# Patient Record
Sex: MALE | Race: BLACK OR AFRICAN AMERICAN | NOT HISPANIC OR LATINO | URBAN - METROPOLITAN AREA
[De-identification: names, ages, dates, MRNs, and addresses within clinical notes are randomized per-mention and may not be internally consistent; named-entity substitution may affect disease eponyms.]

---

## 2018-02-25 ENCOUNTER — INPATIENT (INPATIENT)
Facility: HOSPITAL | Age: 78
LOS: 4 days | Discharge: HOME | End: 2018-03-02
Attending: INTERNAL MEDICINE | Admitting: INTERNAL MEDICINE
Payer: SELF-PAY

## 2018-02-25 VITALS
RESPIRATION RATE: 16 BRPM | HEART RATE: 63 BPM | DIASTOLIC BLOOD PRESSURE: 36 MMHG | OXYGEN SATURATION: 100 % | TEMPERATURE: 98 F | SYSTOLIC BLOOD PRESSURE: 74 MMHG

## 2018-02-25 DIAGNOSIS — I48.91 UNSPECIFIED ATRIAL FIBRILLATION: Chronic | ICD-10-CM

## 2018-02-25 DIAGNOSIS — I10 ESSENTIAL (PRIMARY) HYPERTENSION: Chronic | ICD-10-CM

## 2018-02-25 LAB
APTT BLD: 48.5 SEC — HIGH (ref 27.5–37.4)
BASE EXCESS BLDV CALC-SCNC: -0.3 MMOL/L — SIGNIFICANT CHANGE UP
BASOPHILS # BLD AUTO: 0.07 K/UL — SIGNIFICANT CHANGE UP (ref 0–0.2)
BASOPHILS NFR BLD AUTO: 0.3 % — SIGNIFICANT CHANGE UP (ref 0–2)
BLOOD GAS VENOUS - CREATININE: 3.8 MG/DL — HIGH (ref 0.5–1.3)
CHLORIDE BLDV-SCNC: 105 MMOL/L — SIGNIFICANT CHANGE UP (ref 96–108)
EOSINOPHIL # BLD AUTO: 0.01 K/UL — SIGNIFICANT CHANGE UP (ref 0–0.5)
EOSINOPHIL NFR BLD AUTO: 0 % — SIGNIFICANT CHANGE UP (ref 0–6)
GAS PNL BLDV: 136 MMOL/L — SIGNIFICANT CHANGE UP (ref 136–146)
GLUCOSE BLDV-MCNC: 132 — HIGH (ref 70–99)
HCO3 BLDV-SCNC: 23 MMOL/L — SIGNIFICANT CHANGE UP (ref 20–27)
HCT VFR BLD CALC: 38.7 % — LOW (ref 39–50)
HCT VFR BLDV CALC: 39.3 % — SIGNIFICANT CHANGE UP (ref 39–51)
HGB BLD-MCNC: 13.1 G/DL — SIGNIFICANT CHANGE UP (ref 13–17)
HGB BLDV-MCNC: 12.8 G/DL — LOW (ref 13–17)
IMM GRANULOCYTES # BLD AUTO: 0.43 # — SIGNIFICANT CHANGE UP
IMM GRANULOCYTES NFR BLD AUTO: 2.1 % — HIGH (ref 0–1.5)
INR BLD: 1.98 — HIGH (ref 0.88–1.17)
LACTATE BLDV-MCNC: 3.8 MMOL/L — HIGH (ref 0.5–2)
LYMPHOCYTES # BLD AUTO: 1.13 K/UL — SIGNIFICANT CHANGE UP (ref 1–3.3)
LYMPHOCYTES # BLD AUTO: 5.5 % — LOW (ref 13–44)
MCHC RBC-ENTMCNC: 32.1 PG — SIGNIFICANT CHANGE UP (ref 27–34)
MCHC RBC-ENTMCNC: 33.9 % — SIGNIFICANT CHANGE UP (ref 32–36)
MCV RBC AUTO: 94.9 FL — SIGNIFICANT CHANGE UP (ref 80–100)
MONOCYTES # BLD AUTO: 1.55 K/UL — HIGH (ref 0–0.9)
MONOCYTES NFR BLD AUTO: 7.6 % — SIGNIFICANT CHANGE UP (ref 2–14)
NEUTROPHILS # BLD AUTO: 17.22 K/UL — HIGH (ref 1.8–7.4)
NEUTROPHILS NFR BLD AUTO: 84.5 % — HIGH (ref 43–77)
NRBC # FLD: 0 — SIGNIFICANT CHANGE UP
OB PNL STL: NEGATIVE — SIGNIFICANT CHANGE UP
PCO2 BLDV: 49 MMHG — SIGNIFICANT CHANGE UP (ref 41–51)
PH BLDV: 7.33 PH — SIGNIFICANT CHANGE UP (ref 7.32–7.43)
PLATELET # BLD AUTO: 163 K/UL — SIGNIFICANT CHANGE UP (ref 150–400)
PMV BLD: 10.9 FL — SIGNIFICANT CHANGE UP (ref 7–13)
PO2 BLDV: 42 MMHG — HIGH (ref 35–40)
POTASSIUM BLDV-SCNC: 5.1 MMOL/L — HIGH (ref 3.4–4.5)
PROTHROM AB SERPL-ACNC: 23.1 SEC — HIGH (ref 9.8–13.1)
RBC # BLD: 4.08 M/UL — LOW (ref 4.2–5.8)
RBC # FLD: 12.7 % — SIGNIFICANT CHANGE UP (ref 10.3–14.5)
REVIEW TO FOLLOW: YES — SIGNIFICANT CHANGE UP
SAO2 % BLDV: 68.4 % — SIGNIFICANT CHANGE UP (ref 60–85)
WBC # BLD: 20.41 K/UL — HIGH (ref 3.8–10.5)
WBC # FLD AUTO: 20.41 K/UL — HIGH (ref 3.8–10.5)

## 2018-02-25 RX ORDER — SODIUM CHLORIDE 9 MG/ML
1000 INJECTION INTRAMUSCULAR; INTRAVENOUS; SUBCUTANEOUS ONCE
Qty: 0 | Refills: 0 | Status: COMPLETED | OUTPATIENT
Start: 2018-02-25 | End: 2018-02-25

## 2018-02-25 RX ADMIN — SODIUM CHLORIDE 1000 MILLILITER(S): 9 INJECTION INTRAMUSCULAR; INTRAVENOUS; SUBCUTANEOUS at 23:00

## 2018-02-25 NOTE — ED PROVIDER NOTE - OBJECTIVE STATEMENT
78yo male h/o htn, afib, p/w syncope. Pt was sitting on toilet and had syncope. unsure if fell and hit his head, denies symptoms prior to syncope. pt was resting for rest of the day and then told aide later about syncope and came to ED> NO chest pain, no sob. Pt says just feeling generally unwell. brought to ED, and hypotensive here,  meds: simvastatin, xarelto, metoprolol, lisinopril, cardizem, tamsulosin

## 2018-02-25 NOTE — ED PROVIDER NOTE - PROGRESS NOTE DETAILS
ED Attending: Jeffrey  Pt signed out to me from Dr. Mancia to f/u and reassess.  Pt feeling better, still with some pain.  Found to have diverticulitis and rcv'd zosyn prior to ct. Clearing lactate.  BP improving but still low.  No urinary output today, per pt.  Also with elevated Cr, unknown baseline. US renal pending, to be followed by admitting team.  Accepted by Dr. Waters. ED Attending: Jeffrey  Pt signed out to me from Dr. Mancia to f/u and reassess.  Pt feeling better, still with some pain.  Found to have diverticulitis and rcv'd zosyn prior to ct. Clearing lactate.  BP improving but still low.  No urinary output today, per pt.  Also with elevated Cr, unknown baseline. US renal pending, to be followed by admitting team.  On bedside sono approx 100 ml urine in bladder, after 3 L IV fluids. No urge to urinate at present.  Apparently has a h/o BPH but does not appear to be in retention. Accepted by Dr. Waters.

## 2018-02-25 NOTE — ED PROVIDER NOTE - ATTENDING CONTRIBUTION TO CARE
[place]  pt with  HTN afib  on xarelto   syncopal episode this am on toilet  placed back in bed  Came here due to persistent dizziness  Pt denies CP back pain HA  fever chills  no bleeding  has baseline dark stool on iron  c/o vague pain lt groin  exam  pt alert fluent speech  moving all extremities   lungs clear  card mild bradycardia  abd nontender  nl distal pulses  rectal  green brown stool     bedside FAST negative, bedside  cardic  no effusion  small collapsable IVC  grossly nl LV function  RV<LV  nl RV free wall motion  EKG  no ischemic changes  negative rt sided leads  not hypothermic  Plan  IV fluids  serial CBC  CT noncon to assess for RP bleed     pt w/o baseline lab tests here

## 2018-02-25 NOTE — ED ADULT NURSE NOTE - OBJECTIVE STATEMENT
Pt received to [facility]. Presents alert and oriented from home after an episode of syncope this am while in the shower that required assistance to stand. Pt rpts feeling dizzy prior to passing out. Denies any blood in stools. Rpts chronic black stools due to iron pills. Respirations appear even and unlabored. Rpts being on Xarelto for a heart condition. Noted to be Sinus kathleen on cardiac monitor. VS noted. MD aware of hypotension. R hand 20 gauge IV placed and L AC 18 gauge IV placed. Labs sent. Rpt given to primary RN. Will continue to monitor.

## 2018-02-25 NOTE — ED ADULT TRIAGE NOTE - CHIEF COMPLAINT QUOTE
pt comes to ED for low pressure BP and fall pt is on blood thinners. pt family states BP at home was 60/40. pt BP in triage is 76/36. pt complains of dizziness upon standing. pt also fell at home does not know if he hit his head. pt is on Xarelto. pt complains of dizziness, blurry vision and dry mouth. Charge Nurse called pt brought to [facility] pt comes to ED for low pressure BP and fall pt is on blood thinners. pt family states BP at home was 60/40. pt BP in triage is 76/36. pt complains of dizziness upon standing. pt also fell at home does not know if he hit his head. pt is on Xarelto. pt complains of dizziness, blurry vision and dry mouth. Charge Nurse called pt brought to [facility] unable to get EKG in triage

## 2018-02-25 NOTE — ED PROVIDER NOTE - MEDICAL DECISION MAKING DETAILS
76yo male with syncopal episode today and now hyporentsive, no infectious symptoms, will check labs, ekg, ce, admit

## 2018-02-25 NOTE — ED ADULT NURSE NOTE - CHIEF COMPLAINT QUOTE
pt comes to ED for low pressure BP and fall pt is on blood thinners. pt family states BP at home was 60/40. pt BP in triage is 76/36. pt complains of dizziness upon standing. pt also fell at home does not know if he hit his head. pt is on Xarelto. pt complains of dizziness, blurry vision and dry mouth. Charge Nurse called pt brought to [facility] C unable to get EKG in triage

## 2018-02-25 NOTE — ED PROVIDER NOTE - CARE PLAN
Principal Discharge DX:	Diverticulitis  Secondary Diagnosis:	Renal insufficiency  Secondary Diagnosis:	Sepsis

## 2018-02-26 DIAGNOSIS — R55 SYNCOPE AND COLLAPSE: ICD-10-CM

## 2018-02-26 DIAGNOSIS — I10 ESSENTIAL (PRIMARY) HYPERTENSION: ICD-10-CM

## 2018-02-26 DIAGNOSIS — K80.20 CALCULUS OF GALLBLADDER WITHOUT CHOLECYSTITIS WITHOUT OBSTRUCTION: ICD-10-CM

## 2018-02-26 DIAGNOSIS — Z98.890 OTHER SPECIFIED POSTPROCEDURAL STATES: Chronic | ICD-10-CM

## 2018-02-26 DIAGNOSIS — I48.91 UNSPECIFIED ATRIAL FIBRILLATION: ICD-10-CM

## 2018-02-26 DIAGNOSIS — K57.92 DIVERTICULITIS OF INTESTINE, PART UNSPECIFIED, WITHOUT PERFORATION OR ABSCESS WITHOUT BLEEDING: ICD-10-CM

## 2018-02-26 DIAGNOSIS — N17.9 ACUTE KIDNEY FAILURE, UNSPECIFIED: ICD-10-CM

## 2018-02-26 DIAGNOSIS — Z29.9 ENCOUNTER FOR PROPHYLACTIC MEASURES, UNSPECIFIED: ICD-10-CM

## 2018-02-26 LAB
ALBUMIN SERPL ELPH-MCNC: 3.3 G/DL — SIGNIFICANT CHANGE UP (ref 3.3–5)
ALBUMIN SERPL ELPH-MCNC: 3.6 G/DL — SIGNIFICANT CHANGE UP (ref 3.3–5)
ALP SERPL-CCNC: 53 U/L — SIGNIFICANT CHANGE UP (ref 40–120)
ALP SERPL-CCNC: 53 U/L — SIGNIFICANT CHANGE UP (ref 40–120)
ALT FLD-CCNC: 16 U/L — SIGNIFICANT CHANGE UP (ref 4–41)
ALT FLD-CCNC: 16 U/L — SIGNIFICANT CHANGE UP (ref 4–41)
ANISOCYTOSIS BLD QL: SLIGHT — SIGNIFICANT CHANGE UP
AST SERPL-CCNC: 25 U/L — SIGNIFICANT CHANGE UP (ref 4–40)
AST SERPL-CCNC: 27 U/L — SIGNIFICANT CHANGE UP (ref 4–40)
BASE EXCESS BLDV CALC-SCNC: -0.6 MMOL/L — SIGNIFICANT CHANGE UP
BASOPHILS # BLD AUTO: 0.07 K/UL — SIGNIFICANT CHANGE UP (ref 0–0.2)
BASOPHILS NFR BLD AUTO: 0.4 % — SIGNIFICANT CHANGE UP (ref 0–2)
BASOPHILS NFR SPEC: 0.9 % — SIGNIFICANT CHANGE UP (ref 0–2)
BILIRUB SERPL-MCNC: 0.5 MG/DL — SIGNIFICANT CHANGE UP (ref 0.2–1.2)
BILIRUB SERPL-MCNC: 0.5 MG/DL — SIGNIFICANT CHANGE UP (ref 0.2–1.2)
BLOOD GAS VENOUS - CREATININE: 3.33 MG/DL — HIGH (ref 0.5–1.3)
BUN SERPL-MCNC: 38 MG/DL — HIGH (ref 7–23)
BUN SERPL-MCNC: 42 MG/DL — HIGH (ref 7–23)
CALCIUM SERPL-MCNC: 8.6 MG/DL — SIGNIFICANT CHANGE UP (ref 8.4–10.5)
CALCIUM SERPL-MCNC: 9.9 MG/DL — SIGNIFICANT CHANGE UP (ref 8.4–10.5)
CHLORIDE BLDV-SCNC: 111 MMOL/L — HIGH (ref 96–108)
CHLORIDE SERPL-SCNC: 100 MMOL/L — SIGNIFICANT CHANGE UP (ref 98–107)
CHLORIDE SERPL-SCNC: 104 MMOL/L — SIGNIFICANT CHANGE UP (ref 98–107)
CHOLEST SERPL-MCNC: 121 MG/DL — SIGNIFICANT CHANGE UP (ref 120–199)
CK MB BLD-MCNC: 0.6 — SIGNIFICANT CHANGE UP (ref 0–2.5)
CK MB BLD-MCNC: 1.14 NG/ML — SIGNIFICANT CHANGE UP (ref 1–6.6)
CK MB BLD-MCNC: 3.14 NG/ML — SIGNIFICANT CHANGE UP (ref 1–6.6)
CK MB BLD-MCNC: 3.87 NG/ML — SIGNIFICANT CHANGE UP (ref 1–6.6)
CK SERPL-CCNC: 201 U/L — HIGH (ref 30–200)
CK SERPL-CCNC: 440 U/L — HIGH (ref 30–200)
CK SERPL-CCNC: 617 U/L — HIGH (ref 30–200)
CO2 SERPL-SCNC: 22 MMOL/L — SIGNIFICANT CHANGE UP (ref 22–31)
CO2 SERPL-SCNC: 23 MMOL/L — SIGNIFICANT CHANGE UP (ref 22–31)
CREAT SERPL-MCNC: 2.93 MG/DL — HIGH (ref 0.5–1.3)
CREAT SERPL-MCNC: 3.75 MG/DL — HIGH (ref 0.5–1.3)
EOSINOPHIL # BLD AUTO: 0.15 K/UL — SIGNIFICANT CHANGE UP (ref 0–0.5)
EOSINOPHIL NFR BLD AUTO: 0.9 % — SIGNIFICANT CHANGE UP (ref 0–6)
EOSINOPHIL NFR FLD: 0 % — SIGNIFICANT CHANGE UP (ref 0–6)
GAS PNL BLDV: 132 MMOL/L — LOW (ref 136–146)
GIANT PLATELETS BLD QL SMEAR: PRESENT — SIGNIFICANT CHANGE UP
GLUCOSE BLDV-MCNC: 117 — HIGH (ref 70–99)
GLUCOSE SERPL-MCNC: 125 MG/DL — HIGH (ref 70–99)
GLUCOSE SERPL-MCNC: 99 MG/DL — SIGNIFICANT CHANGE UP (ref 70–99)
HBA1C BLD-MCNC: 6 % — HIGH (ref 4–5.6)
HCO3 BLDV-SCNC: 24 MMOL/L — SIGNIFICANT CHANGE UP (ref 20–27)
HCT VFR BLD CALC: 34 % — LOW (ref 39–50)
HCT VFR BLDV CALC: 34.2 % — LOW (ref 39–51)
HDLC SERPL-MCNC: 65 MG/DL — HIGH (ref 35–55)
HGB BLD-MCNC: 11.5 G/DL — LOW (ref 13–17)
HGB BLDV-MCNC: 11.1 G/DL — LOW (ref 13–17)
IMM GRANULOCYTES # BLD AUTO: 0.13 # — SIGNIFICANT CHANGE UP
IMM GRANULOCYTES NFR BLD AUTO: 0.8 % — SIGNIFICANT CHANGE UP (ref 0–1.5)
LACTATE BLDV-MCNC: 0.9 MMOL/L — SIGNIFICANT CHANGE UP (ref 0.5–2)
LACTATE SERPL-SCNC: 2.2 MMOL/L — HIGH (ref 0.5–2)
LIPID PNL WITH DIRECT LDL SERPL: 48 MG/DL — SIGNIFICANT CHANGE UP
LYMPHOCYTES # BLD AUTO: 1.78 K/UL — SIGNIFICANT CHANGE UP (ref 1–3.3)
LYMPHOCYTES # BLD AUTO: 10.5 % — LOW (ref 13–44)
LYMPHOCYTES NFR SPEC AUTO: 6.4 % — LOW (ref 13–44)
MACROCYTES BLD QL: SLIGHT — SIGNIFICANT CHANGE UP
MAGNESIUM SERPL-MCNC: 1.9 MG/DL — SIGNIFICANT CHANGE UP (ref 1.6–2.6)
MCHC RBC-ENTMCNC: 32.2 PG — SIGNIFICANT CHANGE UP (ref 27–34)
MCHC RBC-ENTMCNC: 33.8 % — SIGNIFICANT CHANGE UP (ref 32–36)
MCV RBC AUTO: 95.2 FL — SIGNIFICANT CHANGE UP (ref 80–100)
METAMYELOCYTES # FLD: 1.8 % — HIGH (ref 0–1)
METHOD TYPE: SIGNIFICANT CHANGE UP
MONOCYTES # BLD AUTO: 1.58 K/UL — HIGH (ref 0–0.9)
MONOCYTES NFR BLD AUTO: 9.3 % — SIGNIFICANT CHANGE UP (ref 2–14)
MONOCYTES NFR BLD: 7.4 % — SIGNIFICANT CHANGE UP (ref 2–9)
NEUTROPHIL AB SER-ACNC: 75.2 % — SIGNIFICANT CHANGE UP (ref 43–77)
NEUTROPHILS # BLD AUTO: 13.27 K/UL — HIGH (ref 1.8–7.4)
NEUTROPHILS NFR BLD AUTO: 78.1 % — HIGH (ref 43–77)
NEUTS BAND # BLD: 8.3 % — HIGH (ref 0–6)
NRBC # FLD: 0 — SIGNIFICANT CHANGE UP
ORGANISM # SPEC MICROSCOPIC CNT: SIGNIFICANT CHANGE UP
ORGANISM # SPEC MICROSCOPIC CNT: SIGNIFICANT CHANGE UP
OVALOCYTES BLD QL SMEAR: SLIGHT — SIGNIFICANT CHANGE UP
PCO2 BLDV: 43 MMHG — SIGNIFICANT CHANGE UP (ref 41–51)
PH BLDV: 7.37 PH — SIGNIFICANT CHANGE UP (ref 7.32–7.43)
PHOSPHATE SERPL-MCNC: 1.8 MG/DL — LOW (ref 2.5–4.5)
PLATELET # BLD AUTO: 145 K/UL — LOW (ref 150–400)
PLATELET COUNT - ESTIMATE: NORMAL — SIGNIFICANT CHANGE UP
PMV BLD: 10.3 FL — SIGNIFICANT CHANGE UP (ref 7–13)
PO2 BLDV: 64 MMHG — HIGH (ref 35–40)
POIKILOCYTOSIS BLD QL AUTO: SLIGHT — SIGNIFICANT CHANGE UP
POTASSIUM BLDV-SCNC: 4.1 MMOL/L — SIGNIFICANT CHANGE UP (ref 3.4–4.5)
POTASSIUM SERPL-MCNC: 4.6 MMOL/L — SIGNIFICANT CHANGE UP (ref 3.5–5.3)
POTASSIUM SERPL-MCNC: 5 MMOL/L — SIGNIFICANT CHANGE UP (ref 3.5–5.3)
POTASSIUM SERPL-SCNC: 4.6 MMOL/L — SIGNIFICANT CHANGE UP (ref 3.5–5.3)
POTASSIUM SERPL-SCNC: 5 MMOL/L — SIGNIFICANT CHANGE UP (ref 3.5–5.3)
PROT SERPL-MCNC: 6.4 G/DL — SIGNIFICANT CHANGE UP (ref 6–8.3)
PROT SERPL-MCNC: 7.1 G/DL — SIGNIFICANT CHANGE UP (ref 6–8.3)
RBC # BLD: 3.57 M/UL — LOW (ref 4.2–5.8)
RBC # FLD: 12.7 % — SIGNIFICANT CHANGE UP (ref 10.3–14.5)
SAO2 % BLDV: 90.7 % — HIGH (ref 60–85)
SODIUM SERPL-SCNC: 138 MMOL/L — SIGNIFICANT CHANGE UP (ref 135–145)
SODIUM SERPL-SCNC: 140 MMOL/L — SIGNIFICANT CHANGE UP (ref 135–145)
SPECIMEN SOURCE: SIGNIFICANT CHANGE UP
TRIGL SERPL-MCNC: 65 MG/DL — SIGNIFICANT CHANGE UP (ref 10–149)
TROPONIN T SERPL-MCNC: < 0.06 NG/ML — SIGNIFICANT CHANGE UP (ref 0–0.06)
TSH SERPL-MCNC: 1.48 UIU/ML — SIGNIFICANT CHANGE UP (ref 0.27–4.2)
WBC # BLD: 16.98 K/UL — HIGH (ref 3.8–10.5)
WBC # FLD AUTO: 16.98 K/UL — HIGH (ref 3.8–10.5)

## 2018-02-26 PROCEDURE — 76705 ECHO EXAM OF ABDOMEN: CPT | Mod: 26

## 2018-02-26 PROCEDURE — 74176 CT ABD & PELVIS W/O CONTRAST: CPT | Mod: 26

## 2018-02-26 PROCEDURE — 70450 CT HEAD/BRAIN W/O DYE: CPT | Mod: 26

## 2018-02-26 PROCEDURE — 99223 1ST HOSP IP/OBS HIGH 75: CPT | Mod: AI,GC

## 2018-02-26 PROCEDURE — 71045 X-RAY EXAM CHEST 1 VIEW: CPT | Mod: 26

## 2018-02-26 PROCEDURE — 76775 US EXAM ABDO BACK WALL LIM: CPT | Mod: 26

## 2018-02-26 RX ORDER — DILTIAZEM HCL 120 MG
1 CAPSULE, EXT RELEASE 24 HR ORAL
Qty: 0 | Refills: 0 | COMMUNITY

## 2018-02-26 RX ORDER — RIVAROXABAN 15 MG-20MG
20 KIT ORAL EVERY 24 HOURS
Qty: 0 | Refills: 0 | Status: DISCONTINUED | OUTPATIENT
Start: 2018-02-26 | End: 2018-02-26

## 2018-02-26 RX ORDER — PIPERACILLIN AND TAZOBACTAM 4; .5 G/20ML; G/20ML
3.38 INJECTION, POWDER, LYOPHILIZED, FOR SOLUTION INTRAVENOUS EVERY 8 HOURS
Qty: 0 | Refills: 0 | Status: DISCONTINUED | OUTPATIENT
Start: 2018-02-26 | End: 2018-03-02

## 2018-02-26 RX ORDER — LISINOPRIL 2.5 MG/1
1 TABLET ORAL
Qty: 0 | Refills: 0 | COMMUNITY

## 2018-02-26 RX ORDER — SODIUM CHLORIDE 9 MG/ML
1000 INJECTION INTRAMUSCULAR; INTRAVENOUS; SUBCUTANEOUS
Qty: 0 | Refills: 0 | Status: DISCONTINUED | OUTPATIENT
Start: 2018-02-26 | End: 2018-02-26

## 2018-02-26 RX ORDER — ACETAMINOPHEN 500 MG
650 TABLET ORAL EVERY 6 HOURS
Qty: 0 | Refills: 0 | Status: DISCONTINUED | OUTPATIENT
Start: 2018-02-26 | End: 2018-03-02

## 2018-02-26 RX ORDER — GABAPENTIN 400 MG/1
300 CAPSULE ORAL AT BEDTIME
Qty: 0 | Refills: 0 | Status: DISCONTINUED | OUTPATIENT
Start: 2018-02-26 | End: 2018-03-02

## 2018-02-26 RX ORDER — DOCUSATE SODIUM 100 MG
1 CAPSULE ORAL
Qty: 0 | Refills: 0 | COMMUNITY

## 2018-02-26 RX ORDER — SIMVASTATIN 20 MG/1
1 TABLET, FILM COATED ORAL
Qty: 0 | Refills: 0 | COMMUNITY

## 2018-02-26 RX ORDER — METRONIDAZOLE 500 MG
500 TABLET ORAL EVERY 8 HOURS
Qty: 0 | Refills: 0 | Status: DISCONTINUED | OUTPATIENT
Start: 2018-02-26 | End: 2018-02-26

## 2018-02-26 RX ORDER — HEPARIN SODIUM 5000 [USP'U]/ML
5000 INJECTION INTRAVENOUS; SUBCUTANEOUS EVERY 8 HOURS
Qty: 0 | Refills: 0 | Status: DISCONTINUED | OUTPATIENT
Start: 2018-02-26 | End: 2018-03-02

## 2018-02-26 RX ORDER — IRON POLYSACCHARIDE COMPLEX 150 MG
1 CAPSULE ORAL
Qty: 0 | Refills: 0 | COMMUNITY

## 2018-02-26 RX ORDER — PIPERACILLIN AND TAZOBACTAM 4; .5 G/20ML; G/20ML
3.38 INJECTION, POWDER, LYOPHILIZED, FOR SOLUTION INTRAVENOUS ONCE
Qty: 0 | Refills: 0 | Status: COMPLETED | OUTPATIENT
Start: 2018-02-26 | End: 2018-02-26

## 2018-02-26 RX ORDER — SODIUM CHLORIDE 9 MG/ML
1000 INJECTION INTRAMUSCULAR; INTRAVENOUS; SUBCUTANEOUS ONCE
Qty: 0 | Refills: 0 | Status: COMPLETED | OUTPATIENT
Start: 2018-02-26 | End: 2018-02-26

## 2018-02-26 RX ORDER — SODIUM CHLORIDE 9 MG/ML
1000 INJECTION, SOLUTION INTRAVENOUS ONCE
Qty: 0 | Refills: 0 | Status: COMPLETED | OUTPATIENT
Start: 2018-02-26 | End: 2018-02-26

## 2018-02-26 RX ORDER — SIMVASTATIN 20 MG/1
10 TABLET, FILM COATED ORAL AT BEDTIME
Qty: 0 | Refills: 0 | Status: DISCONTINUED | OUTPATIENT
Start: 2018-02-26 | End: 2018-03-02

## 2018-02-26 RX ORDER — POTASSIUM PHOSPHATE, MONOBASIC POTASSIUM PHOSPHATE, DIBASIC 236; 224 MG/ML; MG/ML
15 INJECTION, SOLUTION INTRAVENOUS ONCE
Qty: 0 | Refills: 0 | Status: COMPLETED | OUTPATIENT
Start: 2018-02-26 | End: 2018-02-26

## 2018-02-26 RX ORDER — SODIUM CHLORIDE 9 MG/ML
1000 INJECTION INTRAMUSCULAR; INTRAVENOUS; SUBCUTANEOUS
Qty: 0 | Refills: 0 | Status: DISCONTINUED | OUTPATIENT
Start: 2018-02-26 | End: 2018-03-02

## 2018-02-26 RX ORDER — SENNA PLUS 8.6 MG/1
1 TABLET ORAL
Qty: 0 | Refills: 0 | COMMUNITY

## 2018-02-26 RX ORDER — TAMSULOSIN HYDROCHLORIDE 0.4 MG/1
1 CAPSULE ORAL
Qty: 0 | Refills: 0 | COMMUNITY

## 2018-02-26 RX ORDER — CIPROFLOXACIN LACTATE 400MG/40ML
400 VIAL (ML) INTRAVENOUS EVERY 24 HOURS
Qty: 0 | Refills: 0 | Status: DISCONTINUED | OUTPATIENT
Start: 2018-02-26 | End: 2018-02-26

## 2018-02-26 RX ORDER — GABAPENTIN 400 MG/1
1 CAPSULE ORAL
Qty: 0 | Refills: 0 | COMMUNITY

## 2018-02-26 RX ORDER — TAMSULOSIN HYDROCHLORIDE 0.4 MG/1
0.4 CAPSULE ORAL AT BEDTIME
Qty: 0 | Refills: 0 | Status: DISCONTINUED | OUTPATIENT
Start: 2018-02-26 | End: 2018-03-02

## 2018-02-26 RX ORDER — METOPROLOL TARTRATE 50 MG
1 TABLET ORAL
Qty: 0 | Refills: 0 | COMMUNITY

## 2018-02-26 RX ADMIN — PIPERACILLIN AND TAZOBACTAM 200 GRAM(S): 4; .5 INJECTION, POWDER, LYOPHILIZED, FOR SOLUTION INTRAVENOUS at 01:22

## 2018-02-26 RX ADMIN — Medication 650 MILLIGRAM(S): at 20:33

## 2018-02-26 RX ADMIN — PIPERACILLIN AND TAZOBACTAM 25 GRAM(S): 4; .5 INJECTION, POWDER, LYOPHILIZED, FOR SOLUTION INTRAVENOUS at 23:04

## 2018-02-26 RX ADMIN — GABAPENTIN 300 MILLIGRAM(S): 400 CAPSULE ORAL at 23:04

## 2018-02-26 RX ADMIN — Medication 200 MILLIGRAM(S): at 17:16

## 2018-02-26 RX ADMIN — HEPARIN SODIUM 5000 UNIT(S): 5000 INJECTION INTRAVENOUS; SUBCUTANEOUS at 23:04

## 2018-02-26 RX ADMIN — SODIUM CHLORIDE 75 MILLILITER(S): 9 INJECTION INTRAMUSCULAR; INTRAVENOUS; SUBCUTANEOUS at 14:02

## 2018-02-26 RX ADMIN — SODIUM CHLORIDE 1000 MILLILITER(S): 9 INJECTION, SOLUTION INTRAVENOUS at 03:50

## 2018-02-26 RX ADMIN — SIMVASTATIN 10 MILLIGRAM(S): 20 TABLET, FILM COATED ORAL at 23:04

## 2018-02-26 RX ADMIN — TAMSULOSIN HYDROCHLORIDE 0.4 MILLIGRAM(S): 0.4 CAPSULE ORAL at 23:04

## 2018-02-26 RX ADMIN — POTASSIUM PHOSPHATE, MONOBASIC POTASSIUM PHOSPHATE, DIBASIC 62.5 MILLIMOLE(S): 236; 224 INJECTION, SOLUTION INTRAVENOUS at 14:00

## 2018-02-26 RX ADMIN — SODIUM CHLORIDE 100 MILLILITER(S): 9 INJECTION INTRAMUSCULAR; INTRAVENOUS; SUBCUTANEOUS at 16:09

## 2018-02-26 RX ADMIN — SODIUM CHLORIDE 1000 MILLILITER(S): 9 INJECTION INTRAMUSCULAR; INTRAVENOUS; SUBCUTANEOUS at 01:03

## 2018-02-26 NOTE — MEDICAL STUDENT ADULT H&P (EDUCATION) - NS MD HP STUD HX OF PRESENT ILLNESS FT
Patient is a 77 year-old male w/ PMH of HTN and Atrial fibrillation s/p ablation on Xarelto presenting after syncopal event. 1 day prior to admission, patient was sitting on toilet, bending over to dry his toes after his morning bath and states that he "passed out" and then awoke on the ground. Patient was staying with his daughter and she heard him fall. Patient likely "passed out" for a few minutes. Immediately after the event, patient reported blurry vision, dry mouth, imbalance when trying to walk, and blood pressure of 60/30. Symptoms gradually resolved over the course of the day but blood pressure reading was grossly unchanged ~8 hrs after event. Daughter brought patient in due to concern given anticoagulation regimen.    Patient also complaining of 3-4 days of left lower abdominal pain preceding the syncopal event described as sharp, 5-6/10, non-radiating, constant, with no worsening or mitigating factors. He endorses bloating but no nausea, vomiting, diarrhea, or blood in stool.    Patient denies prior syncopal events, lightheadedness, or palpitations. Patient endorses normal diet and intake in the days prior to event but did not eat/drink after the event.  At time of interview, patient denies blurred vision, focal weakness, numbness, leg swelling, dyspnea.    In the ED: patient received 3L of isotonic fluid in 1L boluses as well as a dose of Zosyn 3.375grams

## 2018-02-26 NOTE — MEDICAL STUDENT ADULT H&P (EDUCATION) - NS MD HP STUD PE GI FT
Abdomen distended and tympanitic to percussion. Normal bowel sounds. Tenderness to palpitation in LLQ. Non-tender in other quadarants, including RUQ.

## 2018-02-26 NOTE — H&P ADULT - PROBLEM SELECTOR PLAN 3
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

## 2018-02-26 NOTE — H&P ADULT - PROBLEM SELECTOR PLAN 4
-[id number] score of 3  -on xarelto at home  -will continue to hold given Cr elevation  -will restart once Cr downtrends

## 2018-02-26 NOTE — H&P ADULT - HISTORY OF PRESENT ILLNESS
Patient is a 77 year-old male w/ PMH of HTN and Atrial fibrillation s/p ablation on Xarelto presenting after syncopal event. 1 day prior to admission, patient was sitting on toilet, bending over to dry his toes after his morning bath and states that he "passed out" and then awoke on the ground. Patient was staying with his daughter and she heard him fall. Patient likely "passed out" for a few minutes. Immediately after the event, patient reported blurry vision, dry mouth, imbalance when trying to walk, and blood pressure of 60/30. Symptoms gradually resolved over the course of the day but blood pressure reading was grossly unchanged ~8 hrs after event. Daughter brought patient in due to concern given anticoagulation regimen.    Patient also complaining of 3-4 days of left lower abdominal pain preceding the syncopal event described as sharp, 5-6/10, non-radiating, constant, with no worsening or mitigating factors. He endorses bloating but no nausea, vomiting, diarrhea, or blood in stool.     Patient denies prior syncopal events, lightheadedness, or palpitations. Patient endorses normal diet and intake in the days prior to event but did not eat/drink after the event, stating that he had reduced PO intake yesterday.   At time of interview, patient denies blurred vision, focal weakness, numbness, leg swelling, dyspnea. Patient denied N, V, D, chills, fevers. States that he had a prior colonoscopy 1-2 yrs ago, where he underwent a polypectomy but was not told about diverticular disease.      In the ED: patient received 3L of isotonic fluid in 1L boluses as well as a dose of Zosyn 3.375grams Patient is a 77 year-old male w/ PMH of HTN and Atrial fibrillation s/p ablation on Xarelto presenting after syncopal event.  Of note patient's baseline Cr is 1.50 and  CKD stage 2-3a 2/2 to HTN per patient's PCP office. Patient also has hx of recently diagnosed MGUS. Patient states that 1 day prior to admission, he was sitting on toilet, bending over to dry his toes after his morning bath and states that he "passed out" and then awoke on the ground. Patient was staying with his daughter and she heard him fall. Patient likely "passed out" for a few minutes. Immediately after the event, patient reported blurry vision, dry mouth, imbalance when trying to walk, and blood pressure of 60/30. Symptoms gradually resolved over the course of the day but blood pressure reading was grossly unchanged ~8 hrs after event. Daughter brought patient in due to concern given anticoagulation regimen.    Patient also complaining of 3-4 days of left lower abdominal pain preceding the syncopal event described as sharp, 5-6/10, non-radiating, constant, with no worsening or mitigating factors. He endorses bloating but no nausea, vomiting, diarrhea, or blood in stool.     Patient denies prior syncopal events, lightheadedness, or palpitations. Patient endorses normal diet and intake in the days prior to event but did not eat/drink after the event, stating that he had reduced PO intake yesterday.   At time of interview, patient denies blurred vision, focal weakness, numbness, leg swelling, dyspnea. Patient denied N, V, D, chills, fevers. States that he had a prior colonoscopy 1-2 yrs ago, where he underwent a polypectomy but was not told about diverticular disease.      In the ED: patient received 3L of isotonic fluid in 1L boluses as well as a dose of Zosyn 3.375grams

## 2018-02-26 NOTE — H&P ADULT - PROBLEM SELECTOR PLAN 5
- per U/S Partially contracted gallbladder containing 1.5 cm nonmobile gallstone at   the gallbladder neck.  No evidence of acute cholecystitis.  -Continue to monitor, pt asymtomatic

## 2018-02-26 NOTE — H&P ADULT - PROBLEM SELECTOR PLAN 1
- in the setting of poor PO intake  -likely vasovagal in the setting of dehydration  - s/p 3L IVF boluses  -Continue IVF

## 2018-02-26 NOTE — MEDICAL STUDENT ADULT H&P (EDUCATION) - NS MD HP STUD RESULTS LAB FT
CBC:  WBC - 20.41 -> 16.98  H/H - 13.1/38.7 -> 11.5/34  Plt - 163 -> 145    Coagulation Studies:  INR - 1.98  PT - 23.1  aPTT - 48.5    FOBT - negative

## 2018-02-26 NOTE — MEDICAL STUDENT ADULT H&P (EDUCATION) - NS MD HP STUD ASPLAN ASSES FT
77 year-old male presenting for syncopal episode in the setting of uncomplicated acute diverticulitis with labs significant for leukocytosis, lactatemia without acidosis, and hypophosphatemia. 77 year-old male presenting for syncopal episode, in the setting of uncomplicated acute diverticulitis with labs significant for leukocytosis, lactatemia without acidosis, hypophosphatemia, elevated CK, and labs indicating CRYS. 77 year-old male w/ PMH of HTN and AFib on Xarelto s/p ablation presenting for syncopal episode, in the setting of uncomplicated acute diverticulitis with labs significant for leukocytosis, lactatemia without acidosis, hypophosphatemia, elevated CK, elevated INR, and labs indicating CRYS.

## 2018-02-26 NOTE — MEDICAL STUDENT ADULT H&P (EDUCATION) - NS MD HP STUD PE CONSITUTIONAL FT
Patient well appearing, in no apparent distress, sitting up in bed and openly cooperating with history and physical exam.

## 2018-02-26 NOTE — MEDICAL STUDENT ADULT H&P (EDUCATION) - NS MD HP STUD SOCIAL HX FT
Prior alcohol use - 1 pint/day. Quit in 1984  Prior tobacco use - pack/day for 60 years. Quit 3 years ago.  No other drug use.    Patient lives in [place] with his brother. He occasionally stays with his daughter, who lives near the hospital. She assists in managing his care as she is an RN.  Patient can walk without assistance at baseline functional status.

## 2018-02-26 NOTE — MEDICAL STUDENT ADULT H&P (EDUCATION) - NS MD HP STUD MED REC FT
Med Rec per patient's daughter  Simvastatin 10mg HS  Xarelto 20mg  Metoprolol tartrate 50mg BID  Lisinopril 2.5mg daily  Gabapentin 300mg HS  Diltiazem 120mg daily  Tamulosin 0.4mg daily  Vitamin B  Vitamin D  CoQ10 200mg daily  Ferrex 150 BID  Colace 50  Sennosides 8.6mg

## 2018-02-26 NOTE — MEDICAL STUDENT ADULT H&P (EDUCATION) - NS MD HP STUD RESULTS RAD FT
CXR: Unremarkable  CT Head Non-Contrast: no acute intracranial bleed, mass effect, midline shift  CT Abdomen Non-Contrast: acute uncomplicated diverticulitis, cholelithiasis, tiny calcific granulomas in liver and spleen, no appendix  Abdominal Ultrasound: 1.5 non-mobile gallstone in gallbladder neck, no signs of acute cholecystitis, negative sonographic Neville's sign

## 2018-02-26 NOTE — MEDICAL STUDENT ADULT H&P (EDUCATION) - NS MD HP STUD PE NEURO FT
Cranial Nerves II-XII intact; pupils 3mm and reactive to light, EOMI, sensation equal bilaterally to light touch, facial muscles symmetric, tongue protrusion symmetric, trapezius strength 5/5 bilaterally.    Strength: 5/5 in all extremities  Sensation: intact to light touch in all extremities

## 2018-02-26 NOTE — H&P ADULT - NSHPSOCIALHISTORY_GEN_ALL_CORE
Prior alcohol use - 1 pint/day. Quit in 1984  Prior tobacco use - pack/day for 60 years. Quit 3 years ago.  No other drug use.    Patient lives in [place] with his brother. He occasionally stays with his daughter, who lives near the hospital. She assists in managing his care as she is an RN.  Patient can walk without assistance at baseline functional status.

## 2018-02-26 NOTE — MEDICAL STUDENT ADULT H&P (EDUCATION) - NS MD HP STUD GENERAL FT
Patient is a 77 year-old male w/ PMH of HTN and Atrial fibrillation s/p ablation on Xarelto presenting after syncopal event. 1 day prior to admission, patient was sitting on toilet, bending over to dry his toes after his morning bath and states that he "passed out" and then awoke on the ground. Patient was staying with his daughter and she heard him fall. Patient likely "passed out" for a few minutes. Immediately after the event, patient reported blurry vision, dry mouth, imbalance when trying to walk, and blood pressure of 60/30. These symptoms gradually resolved over the course of the day but daughter brought patient in due to concern given anticoagulation regimen.    Patient also complaining of 3-4 days of left lower abdominal pain preceding the syncopal event described as sharp, 5-6/10, non-radiating, constant, with no worsening or mitigating factors. He endorses bloating but no nausea, vomiting, diarrhea, or blood in stool.    Patient denies prior syncopal events, lightheadedness, or palpitations. [facility] [place]

## 2018-02-26 NOTE — MEDICAL STUDENT ADULT H&P (EDUCATION) - NS MD HP STUD ASPLAN PLAN FT
#Acute Uncomplicated Diverticulitis - #Acute Uncomplicated Diverticulitis -  -Received dose #1 of 3.375g of Zosyn  -Consider switching to Ciprofloxacin + Metronidazole  -Leukocytosis decreasing, follow daily CBC    #Syncope  -Vasovagal vs Orthostatic - perform orthostatic vitals  -continue to monitor hydration status via BMP    #CRYS  -Likely pre-renal due to poor hydration or rhabdomyolosis  -continue to follow CK levels and BMP  -Creatinine downtrending following isotonic fluid boluses x3    #Hypertension  -Continue to monitor  -Hold blood pressure medications due to hypotensive episode following syncopal event.    #Hypophosphatemia  -Asymptomatic, consider oral phosphate replace (total 60-70 mmol TID or QID)    #Diet  -PO trial to determine if patient can tolerate PO diet; consider clear liquid diet  -Patient pre-diabetic, consider low-carb diet #Acute Uncomplicated Diverticulitis -  -Received dose #1 of 3.375g of Zosyn  -Consider switching to Ciprofloxacin + Metronidazole  -Leukocytosis decreasing, follow daily CBC    #Syncope  -Vasovagal vs Orthostatic - perform orthostatic vitals  -continue to monitor hydration status via BMP    #CRYS  -Likely pre-renal due to poor hydration or rhabdomyolosis  -continue to follow CK levels and BMP  -Creatinine downtrending following isotonic fluid boluses x3    #Atrial Fibrillation  -Monitor patient on telemetry  -monitor heart rates and consider rate control with goal   -CHADS-VASC: 3 maintain patient on Xarelto 20mg    #Hypertension  -Continue to monitor  -Hold blood pressure medications due to hypotensive episode following syncopal event.    #Hypophosphatemia  -Asymptomatic, consider oral phosphate replace (total 60-70 mmol TID or QID)    #Diet  -PO trial to determine if patient can tolerate PO diet; consider clear liquid diet  -Patient pre-diabetic, consider low-carb diet #Acute Uncomplicated Diverticulitis -  -Received dose #1 of 3.375g of Zosyn  -Consider switching to Ciprofloxacin + Metronidazole  -Leukocytosis decreasing, follow daily CBC    #Syncope  -Vasovagal vs Orthostatic - perform orthostatic vitals  -continue to monitor hydration status via BMP    #CRYS  -Likely pre-renal due to poor hydration  -continue to follow CK levels and BMP  -Creatinine downtrending following isotonic fluid boluses x3    #Atrial Fibrillation  -Monitor patient on telemetry  -monitor heart rates and consider rate control with goal   -CHADS-VASC: 3 maintain patient on Xarelto 20mg    #Hypertension  -Continue to monitor  -Hold blood pressure medications due to hypotensive episode following syncopal event.    #Hypophosphatemia  -Asymptomatic, consider oral phosphate replace (total 60-70 mmol TID or QID)    #Diet  -PO trial to determine if patient can tolerate PO diet; consider clear liquid diet  -Patient pre-diabetic, consider low-carb diet #Acute Uncomplicated Diverticulitis -  -Received dose #1 of 3.375g of Zosyn  -Consider switching to Ciprofloxacin + Metronidazole  -Leukocytosis decreasing, follow daily CBC  -f/u blood cx    #Syncope  -Vasovagal vs Orthostatic - perform orthostatic vitals  -continue to monitor hydration status via BMP    #CRYS  -Likely pre-renal due to poor hydration  -continue to follow CK levels and BMP  -Creatinine downtrending following isotonic fluid boluses x3  -contact PCP re: baseline Creat    #Atrial Fibrillation  -Monitor patient on telemetry  -monitor heart rates and consider rate control with goal   -CHADS-VASC: 3 will hold Xarelto in the setting of CRYS  #Hypertension  -Continue to monitor  -Hold blood pressure medications due to hypotensive episode following syncopal event.    #Hypophosphatemia  -Asymptomatic, consider oral phosphate replace (total 60-70 mmol TID or QID)    #Diet  -PO trial to determine if patient can tolerate PO diet; consider clear liquid diet  -Patient pre-diabetic, consider low-carb diet

## 2018-02-26 NOTE — MEDICAL STUDENT ADULT H&P (EDUCATION) - NS MD HP STUD ROS NEURO FT
Endorses mild numbness in upper extremity digits bilaterally from prior spinal surgery. Denies any change in numbness, paresthesias, weakness

## 2018-02-26 NOTE — H&P ADULT - ASSESSMENT
77 year-old male w/ PMH of HTN, MGUS, CKD giovanny 2-3a and AFib on Xarelto s/p ablation presenting for syncopal episode. On presentation patient was found to be hypotensive in the setting of LLQ pain with leukocytosis but non tachycardic and afebrile. Patient also has cholelithiasis on imaging.  Patient currently with CRYS on CKD stage 2-3a and uncomplicated diverticulitis per CT findings now on flagyl and cipro.

## 2018-02-27 DIAGNOSIS — A41.9 SEPSIS, UNSPECIFIED ORGANISM: ICD-10-CM

## 2018-02-27 DIAGNOSIS — Z29.9 ENCOUNTER FOR PROPHYLACTIC MEASURES, UNSPECIFIED: ICD-10-CM

## 2018-02-27 LAB
APPEARANCE UR: CLEAR — SIGNIFICANT CHANGE UP
BASOPHILS # BLD AUTO: 0.04 K/UL — SIGNIFICANT CHANGE UP (ref 0–0.2)
BASOPHILS NFR BLD AUTO: 0.5 % — SIGNIFICANT CHANGE UP (ref 0–2)
BILIRUB UR-MCNC: NEGATIVE — SIGNIFICANT CHANGE UP
BLOOD UR QL VISUAL: HIGH
BUN SERPL-MCNC: 23 MG/DL — SIGNIFICANT CHANGE UP (ref 7–23)
CALCIUM SERPL-MCNC: 8.8 MG/DL — SIGNIFICANT CHANGE UP (ref 8.4–10.5)
CHLORIDE SERPL-SCNC: 106 MMOL/L — SIGNIFICANT CHANGE UP (ref 98–107)
CK SERPL-CCNC: 675 U/L — HIGH (ref 30–200)
CO2 SERPL-SCNC: 22 MMOL/L — SIGNIFICANT CHANGE UP (ref 22–31)
COLOR SPEC: SIGNIFICANT CHANGE UP
CREAT ?TM UR-MCNC: 84.27 MG/DL — SIGNIFICANT CHANGE UP
CREAT SERPL-MCNC: 1.78 MG/DL — HIGH (ref 0.5–1.3)
EOSINOPHIL # BLD AUTO: 0.3 K/UL — SIGNIFICANT CHANGE UP (ref 0–0.5)
EOSINOPHIL NFR BLD AUTO: 3.5 % — SIGNIFICANT CHANGE UP (ref 0–6)
GLUCOSE SERPL-MCNC: 93 MG/DL — SIGNIFICANT CHANGE UP (ref 70–99)
GLUCOSE UR-MCNC: NEGATIVE — SIGNIFICANT CHANGE UP
HCT VFR BLD CALC: 36.1 % — LOW (ref 39–50)
HGB BLD-MCNC: 12.2 G/DL — LOW (ref 13–17)
IMM GRANULOCYTES # BLD AUTO: 0.05 # — SIGNIFICANT CHANGE UP
IMM GRANULOCYTES NFR BLD AUTO: 0.6 % — SIGNIFICANT CHANGE UP (ref 0–1.5)
KETONES UR-MCNC: NEGATIVE — SIGNIFICANT CHANGE UP
LEUKOCYTE ESTERASE UR-ACNC: NEGATIVE — SIGNIFICANT CHANGE UP
LYMPHOCYTES # BLD AUTO: 0.57 K/UL — LOW (ref 1–3.3)
LYMPHOCYTES # BLD AUTO: 6.6 % — LOW (ref 13–44)
MAGNESIUM SERPL-MCNC: 1.8 MG/DL — SIGNIFICANT CHANGE UP (ref 1.6–2.6)
MCHC RBC-ENTMCNC: 32 PG — SIGNIFICANT CHANGE UP (ref 27–34)
MCHC RBC-ENTMCNC: 33.8 % — SIGNIFICANT CHANGE UP (ref 32–36)
MCV RBC AUTO: 94.8 FL — SIGNIFICANT CHANGE UP (ref 80–100)
MONOCYTES # BLD AUTO: 0.73 K/UL — SIGNIFICANT CHANGE UP (ref 0–0.9)
MONOCYTES NFR BLD AUTO: 8.4 % — SIGNIFICANT CHANGE UP (ref 2–14)
NEUTROPHILS # BLD AUTO: 6.95 K/UL — SIGNIFICANT CHANGE UP (ref 1.8–7.4)
NEUTROPHILS NFR BLD AUTO: 80.4 % — HIGH (ref 43–77)
NITRITE UR-MCNC: NEGATIVE — SIGNIFICANT CHANGE UP
NRBC # FLD: 0 — SIGNIFICANT CHANGE UP
ORGANISM # SPEC MICROSCOPIC CNT: SIGNIFICANT CHANGE UP
OSMOLALITY UR: 515 MOSMO/KG — SIGNIFICANT CHANGE UP (ref 50–1200)
PH UR: 6 — SIGNIFICANT CHANGE UP (ref 4.6–8)
PHOSPHATE SERPL-MCNC: 2 MG/DL — LOW (ref 2.5–4.5)
PLATELET # BLD AUTO: 138 K/UL — LOW (ref 150–400)
PMV BLD: 10.8 FL — SIGNIFICANT CHANGE UP (ref 7–13)
POTASSIUM SERPL-MCNC: 4.4 MMOL/L — SIGNIFICANT CHANGE UP (ref 3.5–5.3)
POTASSIUM SERPL-SCNC: 4.4 MMOL/L — SIGNIFICANT CHANGE UP (ref 3.5–5.3)
PROT UR-MCNC: 100 MG/DL — SIGNIFICANT CHANGE UP
PTH-INTACT SERPL-MCNC: 59.82 PG/ML — SIGNIFICANT CHANGE UP (ref 15–65)
RBC # BLD: 3.81 M/UL — LOW (ref 4.2–5.8)
RBC # FLD: 12.9 % — SIGNIFICANT CHANGE UP (ref 10.3–14.5)
RBC CASTS # UR COMP ASSIST: SIGNIFICANT CHANGE UP (ref 0–?)
SODIUM SERPL-SCNC: 140 MMOL/L — SIGNIFICANT CHANGE UP (ref 135–145)
SODIUM UR-SCNC: 121 MMOL/L — SIGNIFICANT CHANGE UP
SP GR SPEC: 1.02 — SIGNIFICANT CHANGE UP (ref 1–1.04)
SPECIMEN SOURCE: SIGNIFICANT CHANGE UP
SQUAMOUS # UR AUTO: SIGNIFICANT CHANGE UP
UROBILINOGEN FLD QL: NORMAL MG/DL — SIGNIFICANT CHANGE UP
WBC # BLD: 8.64 K/UL — SIGNIFICANT CHANGE UP (ref 3.8–10.5)
WBC # FLD AUTO: 8.64 K/UL — SIGNIFICANT CHANGE UP (ref 3.8–10.5)
WBC UR QL: SIGNIFICANT CHANGE UP (ref 0–?)

## 2018-02-27 PROCEDURE — 99222 1ST HOSP IP/OBS MODERATE 55: CPT | Mod: GC

## 2018-02-27 PROCEDURE — 99233 SBSQ HOSP IP/OBS HIGH 50: CPT | Mod: GC

## 2018-02-27 RX ORDER — RIVAROXABAN 15 MG-20MG
15 KIT ORAL EVERY 24 HOURS
Qty: 0 | Refills: 0 | Status: DISCONTINUED | OUTPATIENT
Start: 2018-02-27 | End: 2018-03-02

## 2018-02-27 RX ORDER — METOPROLOL TARTRATE 50 MG
50 TABLET ORAL
Qty: 0 | Refills: 0 | Status: DISCONTINUED | OUTPATIENT
Start: 2018-02-27 | End: 2018-02-27

## 2018-02-27 RX ORDER — METOPROLOL TARTRATE 50 MG
25 TABLET ORAL
Qty: 0 | Refills: 0 | Status: DISCONTINUED | OUTPATIENT
Start: 2018-02-27 | End: 2018-03-02

## 2018-02-27 RX ADMIN — SIMVASTATIN 10 MILLIGRAM(S): 20 TABLET, FILM COATED ORAL at 22:23

## 2018-02-27 RX ADMIN — PIPERACILLIN AND TAZOBACTAM 25 GRAM(S): 4; .5 INJECTION, POWDER, LYOPHILIZED, FOR SOLUTION INTRAVENOUS at 15:07

## 2018-02-27 RX ADMIN — GABAPENTIN 300 MILLIGRAM(S): 400 CAPSULE ORAL at 22:22

## 2018-02-27 RX ADMIN — Medication 25 MILLIGRAM(S): at 18:41

## 2018-02-27 RX ADMIN — RIVAROXABAN 15 MILLIGRAM(S): KIT at 18:41

## 2018-02-27 RX ADMIN — HEPARIN SODIUM 5000 UNIT(S): 5000 INJECTION INTRAVENOUS; SUBCUTANEOUS at 05:51

## 2018-02-27 RX ADMIN — SODIUM CHLORIDE 100 MILLILITER(S): 9 INJECTION INTRAMUSCULAR; INTRAVENOUS; SUBCUTANEOUS at 14:45

## 2018-02-27 RX ADMIN — HEPARIN SODIUM 5000 UNIT(S): 5000 INJECTION INTRAVENOUS; SUBCUTANEOUS at 22:22

## 2018-02-27 RX ADMIN — TAMSULOSIN HYDROCHLORIDE 0.4 MILLIGRAM(S): 0.4 CAPSULE ORAL at 22:23

## 2018-02-27 RX ADMIN — Medication 62.5 MILLIMOLE(S): at 15:06

## 2018-02-27 RX ADMIN — PIPERACILLIN AND TAZOBACTAM 25 GRAM(S): 4; .5 INJECTION, POWDER, LYOPHILIZED, FOR SOLUTION INTRAVENOUS at 22:27

## 2018-02-27 RX ADMIN — HEPARIN SODIUM 5000 UNIT(S): 5000 INJECTION INTRAVENOUS; SUBCUTANEOUS at 15:07

## 2018-02-27 RX ADMIN — PIPERACILLIN AND TAZOBACTAM 25 GRAM(S): 4; .5 INJECTION, POWDER, LYOPHILIZED, FOR SOLUTION INTRAVENOUS at 05:51

## 2018-02-27 NOTE — PROGRESS NOTE ADULT - ASSESSMENT
77 year-old male w/ PMH of HTN, MGUS, CKD giovanny 2-3a and AFib on Xarelto s/p ablation presenting for syncopal episode. On presentation patient was found to be hypotensive in the setting of LLQ pain with leukocytosis but non tachycardic and afebrile. Patient also has cholelithiasis on imaging.  Patient currently with CRYS on CKD stage 2-3a and uncomplicated diverticulitis per CT findings now on flagyl and cipro. 77 year-old male w/ PMH of HTN, MGUS, CKD giovanny 2-3a and AFib on Xarelto s/p ablation presenting for syncopal episode. On presentation patient was found to be hypotensive in the setting of LLQ pain with leukocytosis but non tachycardic and afebrile. Patient also has cholelithiasis on imaging.  Patient currently with CRYS on CKD stage 2-3a and uncomplicated diverticulitis per CT findings now on zosyn given E.coli bacteremia and Sepsis.

## 2018-02-27 NOTE — CONSULT NOTE ADULT - SUBJECTIVE AND OBJECTIVE BOX
Patient is a 77y old  Male who presents with a chief complaint of syncope (26 Feb 2018 15:43)    HPI:  77M HTN and Atrial fibrillation s/p ablation on Xarelto presenting after syncopal event. He reports he had gotten out of the bathtub, was sitting on the toilet and bent over to dry his toes and "blacked out". He woke up on the floor and was experiencing blurry vision, dry mouth, imbalance while walking. He states he rested until his daughter came home and took his BP and found to be 60/30. He states after the event in the morning he did not eat or drink all day however did take his pills. He states for the past few weeks he has been experiencing on and off diarrhea and constipation. He reports it fluctuates between both however particularly notes constipatoin since starting iron tablets. He intermittently takes dulcolax for this. Over the past 3 days he has noticed a sharp LLQ abdominal pain that is constant, 6/10, does not radiate. He denies N/V, hematochezia. He does reports that when he goes to urinate he has to be careful because he is fearful he will push out urine and have a BM at the same time.     Patient denies prior syncopal events, lightheadedness, or palpitations. Patient endorses normal diet and intake in the days prior to event but did not eat/drink after the event, stating that he had reduced PO intake yesterday.   At time of interview, patient denies blurred vision, focal weakness, numbness, leg swelling, dyspnea. Patient denied N, V, D, chills, fevers. States that he had a prior colonoscopy 1-2 yrs ago, where he underwent a polypectomy but was not told about diverticular disease.      In the ED: patient received 3L of isotonic fluid in 1L boluses as well as a dose of Zosyn 3.375grams (26 Feb 2018 15:43)      PAST MEDICAL & SURGICAL HISTORY:  Atrial fibrillation  Hypertension  H/O Spinal surgery      Social history:    FAMILY HISTORY:  No pertinent family history in first degree relatives    REVIEW OF SYSTEMS  General:	Denies any malaise fatigue or chills. Fevers absent    Skin:No rash  	  Ophthalmologic:Denies any visual complaints,discharge redness or photophobia  	  ENMT:No nasal discharge,headache,sinus congestion or throat pain.No dental complaints    Respiratory and Thorax:No cough,sputum or chest pain.Denies shortness of breath  	  Cardiovascular:	No chest pain,palpitaions or dizziness    Gastrointestinal:	NO nausea,abdominal pain or diarrhea.    Genitourinary:	No dysuria,frequency. No flank pain    Musculoskeletal:	No joint swelling or pain.No weakness    Neurological:No confusion,diziness.No extremity weakness.No bladder or bowel incontinence	    Psychiatric:No delusions or hallucinations	    Hematology/Lymphatics:	No LN swelling.No gum bleeding     Endocrine:	No recent weight gain or loss.No abnormal heat/cold intolerance    Allergic/Immunologic:	No hives or rash   Allergies    No Known Allergies    Intolerances        Antimicrobials:    piperacillin/tazobactam IVPB. 3.375 Gram(s) IV Intermittent every 8 hours        Vital Signs Last 24 Hrs  T(C): 37.3 (27 Feb 2018 05:16), Max: 37.8 (26 Feb 2018 17:04)  T(F): 99.1 (27 Feb 2018 05:16), Max: 100.1 (26 Feb 2018 20:09)  HR: 86 (27 Feb 2018 12:58) (78 - 150)  BP: 158/89 (27 Feb 2018 12:58) (102/59 - 158/89)  BP(mean): 73 (26 Feb 2018 15:43) (73 - 73)  RR: 18 (27 Feb 2018 12:58) (16 - 20)  SpO2: 98% (27 Feb 2018 12:58) (98% - 100%)    PHYSICAL EXAM:Pleasant patient in no acute distress.      Constitutional:Comfortable.Awake and alert  No cachexia     Eyes:PERRL EOMI.NO discharge or conjunctival injection    ENMT:No sinus tenderness.No thrush.No pharyngeal exudate or erythema.Fair dental hygiene    Neck:Supple,No LN,no JVD      Respiratory:Good air entry bilaterally,CTA    Cardiovascular:S1 S2 wnl, No murmurs,rub or gallops    Gastrointestinal:Soft BS(+) no tenderness no masses ,No rebound or guarding    Genitourinary:No CVA tendereness     Rectal:    Extremities:No cyanosis,clubbing or edema.    Vascular:peripheral pulses felt    Neurological:AAO X 3,No grossly focal deficits    Skin:No rash     Lymph Nodes:No palpable LNs    Musculoskeletal:No joint swelling or LOM    Psychiatric:Affect normal.                                12.2   8.64  )-----------( 138      ( 27 Feb 2018 06:30 )             36.1         02-27    140  |  106  |  23  ----------------------------<  93  4.4   |  22  |  1.78<H>    Ca    8.8      27 Feb 2018 06:30  Phos  2.0     02-27  Mg     1.8     02-27    TPro  6.4  /  Alb  3.3  /  TBili  0.5  /  DBili  x   /  AST  25  /  ALT  16  /  AlkPhos  53  02-26      RECENT CULTURES:  02-26 @ 02:13  BLOOD  BLOOD CULTURE PCR  Escherichia coli  BLOOD CULTURE PCR  PCR  --    ***Blood Panel PCR results on this specimen are available  approximately 3 hours after the Gram stain result***  Gram stain, PCR, and/or culture results may not always  correspond due to difference in methodologies  ------------------------------------------------------------  This PCR assay was performed using [employer].  The  following targets are tested for:  Enterococcus, vancomycin  resistant enterococci, Listeria monocytogenes,  coagulase  negative staphylococci, S. aureus, methicillin resistant S.  aureus, Streptococcus agalactiae (Group B), S. pneumoniae,  S. pyogenes (Group A), Acinetobacter baumannii, Enterobacter  cloacae, E. coli, Klebsiella oxytoca, K. pneumoniae, Proteus  sp., Serratia marcescens, Haemophilus influenzae, Neisseria  meningitidis, Pseudomonas aeruginosa, Candida albicans, C.  glabrata, C. krusei, C. parapsilosis, C. tropicalis and the  KPC resistance gene.  **NOTE: Due to technical problems, Proteus sp. will NOT be  reported as part of the BCID paneluntil further notice.      MICROBIOLOGY:          Radiology:      Assessment:        Recommendations and Plan: Patient is a 77y old  Male who presents with a chief complaint of syncope (26 Feb 2018 15:43)    HPI:  77M HTN and Atrial fibrillation s/p ablation on Xarelto presenting after syncopal event. He reports he had gotten out of the bathtub, was sitting on the toilet and bent over to dry his toes and "blacked out". He woke up on the floor and was experiencing blurry vision, dry mouth, imbalance while walking. He states he rested until his daughter came home and took his BP and found to be 60/30. He states after the event in the morning he did not eat or drink all day however did take his pills. He states for the past few weeks he has been experiencing on and off diarrhea and constipation. He reports it fluctuates between both however particularly notes constipatoin since starting iron tablets. He intermittently takes dulcolax for this. Over the past 3 days he has noticed a sharp LLQ abdominal pain that is constant, 6/10, does not radiate. He denies N/V, hematochezia. He does reports that when he goes to urinate he has to be careful because he is fearful he will push out urine and have a BM at the same time. He reports episodes of chills at home, did not take his temperature.    Patient denies prior syncopal events, lightheadedness, or palpitations. He reports increased urination at night with occasional dribbling. No dysuria or hematuria. Of note, he reports he had a colonoscopy a couple years ago and was told he had 2 polyps removed. He reports a few weeks ago he swallowed a camera pill and never passed it so he had to have an upper endoscopy in which it was retrieved. He states that during the upper endoscopy he was told that they placed another camera pill in order to be able to see the lower intestine since the first one got stuck. He reports he has not passed anything that he's noticed in his stool.     As of now patient states he feels much better. Abdominal pain has improved however since last night he has been having watery bowel movements, no blood, dark in color. He denies any chills, N/V at this time.      PAST MEDICAL & SURGICAL HISTORY:  Atrial fibrillation  Hypertension  H/O Spinal surgery      Social history: Never smoked, doesn't drink, no drug use. Lives at home with daughter.     FAMILY HISTORY:  No pertinent family history in first degree relatives    REVIEW OF SYSTEMS: see HPI      Allergies  No Known Allergies      Antimicrobials:  piperacillin/tazobactam IVPB. 3.375 Gram(s) IV Intermittent every 8 hours        Vital Signs Last 24 Hrs  T(C): 37.3 (27 Feb 2018 05:16), Max: 37.8 (26 Feb 2018 17:04)  T(F): 99.1 (27 Feb 2018 05:16), Max: 100.1 (26 Feb 2018 20:09)  HR: 86 (27 Feb 2018 12:58) (78 - 150)  BP: 158/89 (27 Feb 2018 12:58) (102/59 - 158/89)  BP(mean): 73 (26 Feb 2018 15:43) (73 - 73)  RR: 18 (27 Feb 2018 12:58) (16 - 20)  SpO2: 98% (27 Feb 2018 12:58) (98% - 100%)    PHYSICAL EXAM:  GENERAL: NAD, well-developed  HEAD:  Atraumatic, Normocephalic  EYES: EOMI, PERRLA, conjunctiva and sclera clear  NECK: Supple, No JVD  CHEST/LUNG: Clear to auscultation bilaterally; No wheezes or rales  HEART: tachycardic, regular rhythm; No murmurs, rubs, or gallops  ABDOMEN: Soft, tender to deep palpation in LLQ, no rebound, no guarding, distended; Bowel sounds present  EXTREMITIES:  2+ Peripheral Pulses, No clubbing, cyanosis, or edema  PSYCH: AAOx3  NEUROLOGY: non-focal  SKIN: No rashes or lesions                              12.2   8.64  )-----------( 138      ( 27 Feb 2018 06:30 )             36.1         02-27    140  |  106  |  23  ----------------------------<  93  4.4   |  22  |  1.78<H>    Ca    8.8      27 Feb 2018 06:30  Phos  2.0     02-27  Mg     1.8     02-27    TPro  6.4  /  Alb  3.3  /  TBili  0.5  /  DBili  x   /  AST  25  /  ALT  16  /  AlkPhos  53  02-26      RECENT CULTURES:  02-26 @ 02:13  BLOOD  BLOOD CULTURE PCR  Escherichia coli  BLOOD CULTURE PCR  PCR  --    ***Blood Panel PCR results on this specimen are available  approximately 3 hours after the Gram stain result***  Gram stain, PCR, and/or culture results may not always  correspond due to difference in methodologies  ------------------------------------------------------------  This PCR assay was performed using [employer]  The  following targets are tested for:  Enterococcus, vancomycin  resistant enterococci, Listeria monocytogenes,  coagulase  negative staphylococci, S. aureus, methicillin resistant S.  aureus, Streptococcus agalactiae (Group B), S. pneumoniae,  S. pyogenes (Group A), Acinetobacter baumannii, Enterobacter  cloacae, E. coli, Klebsiella oxytoca, K. pneumoniae, Proteus  sp., Serratia marcescens, Haemophilus influenzae, Neisseria  meningitidis, Pseudomonas aeruginosa, Candida albicans, C.  glabrata, C. krusei, C. parapsilosis, C. tropicalis and the  KPC resistance gene.  **NOTE: Due to technical problems, Proteus sp. will NOT be  reported as part of the BCID paneluntil further notice.      MICROBIOLOGY:          Radiology: < from: CT Abdomen and Pelvis No Cont (02.26.18 @ 01:45) >  FINDINGS:  LOWER CHEST: Within normal limits.  LIVER: Tiny calcified granulomas.  BILE DUCTS: Normal caliber.  GALLBLADDER: Cholelithiasis.  SPLEEN: Tiny calcified granulomas.  PANCREAS: Within normal limits.  ADRENALS: Within normal limits.  KIDNEYS/URETERS: Within normal limits.  BLADDER: Within normal limits.  REPRODUCTIVE ORGANS:       BOWEL: There is a focal wall thickening with pericolic stranding (2-53)   of the midportion of the descending colon. No bowel obstruction. Appendix    was surgically removed.      PERITONEUM: No ascites.  VESSELS:  Within normal limits.  RETROPERITONEUM: No lymphadenopathy.    ABDOMINAL WALL: Within normal limits.  BONES: Within normal limits. Patient is a 77y old  Male who presents with a chief complaint of syncope (26 Feb 2018 15:43)    HPI:  77M HTN and Atrial fibrillation s/p ablation on Xarelto presenting after syncopal event. He reports he had gotten out of the bathtub, was sitting on the toilet and bent over to dry his toes and "blacked out". He woke up on the floor and was experiencing blurry vision, dry mouth, imbalance while walking. He states he rested until his daughter came home and took his BP and found to be 60/30. He states after the event in the morning he did not eat or drink all day however did take his pills. He states for the past few weeks he has been experiencing on and off diarrhea and constipation. He reports it fluctuates between both however particularly notes constipatoin since starting iron tablets. He intermittently takes dulcolax for this. Over the past 3 days he has noticed a sharp LLQ abdominal pain that is constant, 6/10, does not radiate. He denies N/V, hematochezia. He does reports that when he goes to urinate he has to be careful because he is fearful he will push out urine and have a BM at the same time. He reports episodes of chills at home, did not take his temperature.    Patient denies prior syncopal events, lightheadedness, or palpitations. He reports increased urination at night with occasional dribbling. No dysuria or hematuria. Of note, he reports he had a colonoscopy a couple years ago and was told he had 2 polyps removed. He reports a few weeks ago he swallowed a camera pill and never passed it so he had to have an upper endoscopy in which it was retrieved. He states that during the upper endoscopy he was told that they placed another camera pill in order to be able to see the lower intestine since the first one got stuck. He reports he has not passed anything that he's noticed in his stool.     As of now patient states he feels much better. Abdominal pain has improved however since last night he has been having watery bowel movements, no blood, dark in color. He denies any chills, N/V at this time.      PAST MEDICAL & SURGICAL HISTORY:  Atrial fibrillation  Hypertension  H/O Spinal surgery      Social history: Never smoked, doesn't drink, no drug use. Lives at home with daughter.     FAMILY HISTORY:  No pertinent family history in first degree relatives    REVIEW OF SYSTEMS: see HPI      Allergies  No Known Allergies      Antimicrobials:  piperacillin/tazobactam IVPB. 3.375 Gram(s) IV Intermittent every 8 hours        Vital Signs Last 24 Hrs  T(C): 37.3 (27 Feb 2018 05:16), Max: 37.8 (26 Feb 2018 17:04)  T(F): 99.1 (27 Feb 2018 05:16), Max: 100.1 (26 Feb 2018 20:09)  HR: 86 (27 Feb 2018 12:58) (78 - 150)  BP: 158/89 (27 Feb 2018 12:58) (102/59 - 158/89)  BP(mean): 73 (26 Feb 2018 15:43) (73 - 73)  RR: 18 (27 Feb 2018 12:58) (16 - 20)  SpO2: 98% (27 Feb 2018 12:58) (98% - 100%)    PHYSICAL EXAM:  GENERAL: NAD, well-developed  HEAD:  Atraumatic, Normocephalic  EYES: EOMI, PERRLA, conjunctiva and sclera clear  NECK: Supple, No JVD  CHEST/LUNG: Clear to auscultation bilaterally; No wheezes or rales  HEART: tachycardic, regular rhythm; No murmurs, rubs, or gallops  ABDOMEN: Soft, tender to deep palpation in LLQ, no rebound, no guarding, distended; Bowel sounds present  EXTREMITIES:  2+ Peripheral Pulses, No clubbing, cyanosis, or edema  PSYCH: AAOx3  NEUROLOGY: non-focal  SKIN: No rashes or lesions                              12.2   8.64  )-----------( 138      ( 27 Feb 2018 06:30 )             36.1         02-27    140  |  106  |  23  ----------------------------<  93  4.4   |  22  |  1.78<H>    Ca    8.8      27 Feb 2018 06:30  Phos  2.0     02-27  Mg     1.8     02-27    TPro  6.4  /  Alb  3.3  /  TBili  0.5  /  DBili  x   /  AST  25  /  ALT  16  /  AlkPhos  53  02-26      RECENT CULTURES:  02-26 @ 02:13  BLOOD  BLOOD CULTURE PCR  Escherichia coli  BLOOD CULTURE PCR  PCR  --    ***Blood Panel PCR results on this specimen are available  approximately 3 hours after the Gram stain result***  Gram stain, PCR, and/or culture results may not always  correspond due to difference in methodologies  ------------------------------------------------------------  This PCR assay was performed using [employer].  The  following targets are tested for:  Enterococcus, vancomycin  resistant enterococci, Listeria monocytogenes,  coagulase  negative staphylococci, S. aureus, methicillin resistant S.  aureus, Streptococcus agalactiae (Group B), S. pneumoniae,  S. pyogenes (Group A), Acinetobacter baumannii, Enterobacter  cloacae, E. coli, Klebsiella oxytoca, K. pneumoniae, Proteus  sp., Serratia marcescens, Haemophilus influenzae, Neisseria  meningitidis, Pseudomonas aeruginosa, Candida albicans, C.  glabrata, C. krusei, C. parapsilosis, C. tropicalis and the  KPC resistance gene.  **NOTE: Due to technical problems, Proteus sp. will NOT be  reported as part of the BCID paneluntil further notice.      Radiology: < from: CT Abdomen and Pelvis No Cont (02.26.18 @ 01:45) >  FINDINGS:  LOWER CHEST: Within normal limits.  LIVER: Tiny calcified granulomas.  BILE DUCTS: Normal caliber.  GALLBLADDER: Cholelithiasis.  SPLEEN: Tiny calcified granulomas.  PANCREAS: Within normal limits.  ADRENALS: Within normal limits.  KIDNEYS/URETERS: Within normal limits.  BLADDER: Within normal limits.  REPRODUCTIVE ORGANS:       BOWEL: There is a focal wall thickening with pericolic stranding (2-53)   of the midportion of the descending colon. No bowel obstruction. Appendix    was surgically removed.      PERITONEUM: No ascites.  VESSELS:  Within normal limits.  RETROPERITONEUM: No lymphadenopathy.    ABDOMINAL WALL: Within normal limits.  BONES: Within normal limits. Patient is a 77y old  Male who presents with a chief complaint of syncope (26 Feb 2018 15:43)    HPI:  77M HTN and Atrial fibrillation s/p ablation on Xarelto presenting after syncopal event. He reports he had gotten out of the bathtub, was sitting on the toilet and bent over to dry his toes and "blacked out". He woke up on the floor and was experiencing blurry vision, dry mouth, imbalance while walking. He states he rested until his daughter came home and took his BP and found to be 60/30. He states after the event in the morning he did not eat or drink all day however did take his pills. He states for the past few weeks he has been experiencing on and off diarrhea and constipation. He reports it fluctuates between both however particularly notes constipatoin since starting iron tablets. He intermittently takes dulcolax for this. Over the past 3 days he has noticed a sharp LLQ abdominal pain that is constant, 6/10, does not radiate. He denies N/V, hematochezia. He does reports that when he goes to urinate he has to be careful because he is fearful he will push out urine and have a BM at the same time. He reports episodes of chills at home, did not take his temperature.    Patient denies prior syncopal events, lightheadedness, or palpitations. He reports increased urination at night with occasional dribbling. No dysuria or hematuria. Of note, he reports he had a colonoscopy a couple years ago and was told he had 2 polyps removed. He reports a few weeks ago he swallowed a camera pill and never passed it so he had to have an upper endoscopy in which it was retrieved. He states that during the upper endoscopy he was told that they placed another camera pill in order to be able to see the lower intestine since the first one got stuck. He reports he has not passed anything that he's noticed in his stool.     As of now patient states he feels much better. Abdominal pain has improved however since last night he has been having watery bowel movements, no blood, dark in color. He denies any chills, N/V at this time.      PAST MEDICAL & SURGICAL HISTORY:  Atrial fibrillation  Hypertension  H/O Spinal surgery      Social history: Never smoked, denies alcohol use, no drug use. Lives at home with daughter.     FAMILY HISTORY:  No pertinent family history in first degree relatives    REVIEW OF SYSTEMS:   CONSTITUTIONAL: + chills, +fevers,  no weight loss  HEENT:  Eyes:  No visual loss, double vision or yellow sclerae.   Ears, Nose, Throat:  No hearing loss, sneezing, congestion, runny nose or dysphagia.  SKIN:  No rash or itching.  CARDIOVASCULAR:  No chest pain, chest pressure or chest discomfort. No palpitations or edema.  RESPIRATORY:  No shortness of breath, cough or sputum.  GASTROINTESTINAL:  see HPI  GENITOURINARY:  see HPI  NEUROLOGICAL: see HPI. also reports chronic focal numbness in right forearm.   MUSCULOSKELETAL:  No muscle, back pain, joint pain or stiffness.  HEMATOLOGIC:  No bleeding or bruising.      Allergies  No Known Allergies      Antimicrobials:  piperacillin/tazobactam IVPB. 3.375 Gram(s) IV Intermittent every 8 hours        Vital Signs Last 24 Hrs  T(C): 37.3 (27 Feb 2018 05:16), Max: 37.8 (26 Feb 2018 17:04)  T(F): 99.1 (27 Feb 2018 05:16), Max: 100.1 (26 Feb 2018 20:09)  HR: 86 (27 Feb 2018 12:58) (78 - 150)  BP: 158/89 (27 Feb 2018 12:58) (102/59 - 158/89)  BP(mean): 73 (26 Feb 2018 15:43) (73 - 73)  RR: 18 (27 Feb 2018 12:58) (16 - 20)  SpO2: 98% (27 Feb 2018 12:58) (98% - 100%)    PHYSICAL EXAM:  GENERAL: NAD, well-developed  HEAD:  Atraumatic, Normocephalic  EYES: EOMI, PERRLA, conjunctiva and sclera clear  NECK: Supple, No JVD  CHEST/LUNG: Clear to auscultation bilaterally; No wheezes or rales  HEART: tachycardic, regular rhythm; No murmurs, rubs, or gallops  ABDOMEN: Soft, tender to deep palpation in LLQ, no rebound, no guarding, distended; Bowel sounds present  EXTREMITIES:  2+ Peripheral Pulses, No clubbing, cyanosis, or edema  PSYCH: AAOx3  NEUROLOGY: focal numbness on right forearm  SKIN: No rashes or lesions                              12.2   8.64  )-----------( 138      ( 27 Feb 2018 06:30 )             36.1         02-27    140  |  106  |  23  ----------------------------<  93  4.4   |  22  |  1.78<H>    Ca    8.8      27 Feb 2018 06:30  Phos  2.0     02-27  Mg     1.8     02-27    TPro  6.4  /  Alb  3.3  /  TBili  0.5  /  DBili  x   /  AST  25  /  ALT  16  /  AlkPhos  53  02-26      RECENT CULTURES:  02-26 @ 02:13  BLOOD  BLOOD CULTURE PCR  Escherichia coli  BLOOD CULTURE PCR  PCR  --    ***Blood Panel PCR results on this specimen are available  approximately 3 hours after the Gram stain result***  Gram stain, PCR, and/or culture results may not always  correspond due to difference in methodologies  ------------------------------------------------------------  This PCR assay was performed using [employer].  The  following targets are tested for:  Enterococcus, vancomycin  resistant enterococci, Listeria monocytogenes,  coagulase  negative staphylococci, S. aureus, methicillin resistant S.  aureus, Streptococcus agalactiae (Group B), S. pneumoniae,  S. pyogenes (Group A), Acinetobacter baumannii, Enterobacter  cloacae, E. coli, Klebsiella oxytoca, K. pneumoniae, Proteus  sp., Serratia marcescens, Haemophilus influenzae, Neisseria  meningitidis, Pseudomonas aeruginosa, Candida albicans, C.  glabrata, C. krusei, C. parapsilosis, C. tropicalis and the  KPC resistance gene.  **NOTE: Due to technical problems, Proteus sp. will NOT be  reported as part of the BCID paneluntil further notice.      Radiology: < from: CT Abdomen and Pelvis No Cont (02.26.18 @ 01:45) >  FINDINGS:  LOWER CHEST: Within normal limits.  LIVER: Tiny calcified granulomas.  BILE DUCTS: Normal caliber.  GALLBLADDER: Cholelithiasis.  SPLEEN: Tiny calcified granulomas.  PANCREAS: Within normal limits.  ADRENALS: Within normal limits.  KIDNEYS/URETERS: Within normal limits.  BLADDER: Within normal limits.  REPRODUCTIVE ORGANS:       BOWEL: There is a focal wall thickening with pericolic stranding (2-53)   of the midportion of the descending colon. No bowel obstruction. Appendix    was surgically removed.      PERITONEUM: No ascites.  VESSELS:  Within normal limits.  RETROPERITONEUM: No lymphadenopathy.    ABDOMINAL WALL: Within normal limits.  BONES: Within normal limits. Patient is a 77y old  Male who presents with a chief complaint of syncope (26 Feb 2018 15:43)    HPI:  77M HTN and Atrial fibrillation s/p ablation on Xarelto presenting after syncopal event. He reports he had gotten out of the bathtub, was sitting on the toilet and bent over to dry his toes and "blacked out". He woke up on the floor and was experiencing blurry vision, dry mouth, imbalance while walking. He states he rested until his daughter came home and took his BP and found to be 60/30. He states after the event in the morning he did not eat or drink all day however did take his pills. He states for the past few weeks he has been experiencing on and off diarrhea and constipation. He reports it fluctuates between both however particularly notes constipatoin since starting iron tablets. He intermittently takes dulcolax for this. Over the past 3 days he has noticed a sharp LLQ abdominal pain that is constant, 6/10, does not radiate. He denies N/V, hematochezia. He does reports that when he goes to urinate he has to be careful because he is fearful he will push out urine and have a BM at the same time. He reports episodes of chills at home, did not take his temperature.    Patient denies prior syncopal events, lightheadedness, or palpitations. He reports increased urination at night with occasional dribbling. No dysuria or hematuria. Of note, he reports he had a colonoscopy a couple years ago and was told he had 2 polyps removed. He reports a few weeks ago he swallowed a camera pill and never passed it so he had to have an upper endoscopy in which it was retrieved. He states that during the upper endoscopy he was told that they placed another camera pill in order to be able to see the lower intestine since the first one got stuck. He reports he has not passed anything that he's noticed in his stool.     As of now patient states he feels much better. Abdominal pain has improved however since last night he has been having watery bowel movements, no blood, dark in color. He denies any chills, N/V at this time.    PAST MEDICAL & SURGICAL HISTORY:  Atrial fibrillation  Hypertension  H/O Spinal surgery    Social history: Never smoked, denies alcohol use, no drug use. Lives at home with daughter.     FAMILY HISTORY:  No pertinent family history in first degree relatives    REVIEW OF SYSTEMS:   CONSTITUTIONAL: + chills, +fevers,  no weight loss  HEENT:  Eyes:  No visual loss, double vision or yellow sclerae.   Ears, Nose, Throat:  No hearing loss, sneezing, congestion, runny nose or dysphagia.  SKIN:  No rash or itching.  CARDIOVASCULAR:  No chest pain, chest pressure or chest discomfort. No palpitations or edema.  RESPIRATORY:  No shortness of breath, cough or sputum.  GASTROINTESTINAL:  see HPI  GENITOURINARY:  see HPI  NEUROLOGICAL: see HPI. also reports chronic focal numbness in right forearm.   MUSCULOSKELETAL:  No muscle, back pain, joint pain or stiffness.  HEMATOLOGIC:  No bleeding or bruising.    Allergies  No Known Allergies    Antimicrobials:  piperacillin/tazobactam IVPB. 3.375 Gram(s) IV Intermittent every 8 hours (2/26-)    Vital Signs Last 24 Hrs  T(C): 37.3 (27 Feb 2018 05:16), Max: 37.8 (26 Feb 2018 17:04)  T(F): 99.1 (27 Feb 2018 05:16), Max: 100.1 (26 Feb 2018 20:09)  HR: 86 (27 Feb 2018 12:58) (78 - 150)  BP: 158/89 (27 Feb 2018 12:58) (102/59 - 158/89)  BP(mean): 73 (26 Feb 2018 15:43) (73 - 73)  RR: 18 (27 Feb 2018 12:58) (16 - 20)  SpO2: 98% (27 Feb 2018 12:58) (98% - 100%)    PHYSICAL EXAM:  GENERAL: NAD, well-developed  HEAD:  Atraumatic, Normocephalic  EYES: EOMI, PERRLA, conjunctiva and sclera clear  NECK: Supple, No JVD  CHEST/LUNG: Clear to auscultation bilaterally; No wheezes or rales  HEART: tachycardic, regular rhythm; No murmurs, rubs, or gallops  ABDOMEN: Soft, tender to deep palpation in LLQ, no rebound, no guarding, distended; Bowel sounds present  EXTREMITIES:  2+ Peripheral Pulses, No clubbing, cyanosis, or edema  PSYCH: AAOx3  NEUROLOGY: focal numbness on right forearm  SKIN: No rashes or lesions                        12.2   8.64  )-----------( 138      ( 27 Feb 2018 06:30 )             36.1     140  |  106  |  23  ----------------------------<  93  4.4   |  22  |  1.78<H>    Ca    8.8      27 Feb 2018 06:30  Phos  2.0     02-27  Mg     1.8     02-27    TPro  6.4  /  Alb  3.3  /  TBili  0.5  /  DBili  x   /  AST  25  /  ALT  16  /  AlkPhos  53  02-26    RECENT CULTURES:  Culture - Blood (02.26.18 @ 02:13)  GNR^Gram Neg Rods  AFTER: 24 HOURS INCUBATION  BOTTLE: ANAEROBIC BOTTLE    Culture - Blood (02.26.18 @ 02:13)  AFTER: 13 HOURS INCUBATION  BOTTLE: AEROBIC   ANAEROBIC BOTTLES    Organism Identification: BLOOD CULTURE PCR  Escherichia coli    Method Type: PCR    Radiology:   CT Abdomen and Pelvis No Cont (02.26.18 @ 01:45)  BOWEL: There is a focal wall thickening with pericolic stranding (2-53) of the midportion of the descending colon. No bowel obstruction. Appendix  was surgically removed.

## 2018-02-27 NOTE — PROGRESS NOTE ADULT - PROBLEM SELECTOR PLAN 1
- in the setting of poor PO intake  -likely vasovagal in the setting of dehydration  - s/p 3L IVF boluses  -Continue IVF -Pt with E.coli bacteremia in the setting of diverticulitis, coming in with hypotension and now with episodes of tachycardia   -Today Day 2 of piptazo  -Blood cultures ordered   -continue IVF

## 2018-02-27 NOTE — PROGRESS NOTE ADULT - PROBLEM SELECTOR PLAN 3
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

## 2018-02-27 NOTE — PROGRESS NOTE ADULT - PROBLEM SELECTOR PLAN 4
-[id number] score of 3  -on xarelto at home  -will continue to hold given Cr elevation  -will restart once Cr downtrends - in the setting of poor PO intake and dehydration  -No further episodes   - s/p 3L IVF boluses  -Continue IVF

## 2018-02-27 NOTE — PROGRESS NOTE ADULT - PROBLEM SELECTOR PLAN 2
-acute uncomplicated diverticulitis found on abdominal CT (non-contrast)  -s/p 3doses of Zosyn -likely due to orthostasis  -patient initially hypotensive, blood pressure normalized s/p 3L of IVF

## 2018-02-27 NOTE — PROGRESS NOTE ADULT - ATTENDING COMMENTS
Sepsis with GNR bacteremia. Will continue Zosyn, repeat Blood cx and obtain ID consult.  Start Cardizem 30mg PO Q6H and metoprolol for rate control

## 2018-02-27 NOTE — PROGRESS NOTE ADULT - SUBJECTIVE AND OBJECTIVE BOX
Patient interviewed at bedside. Patient states that abdominal pain remains in the LLQ and is decreasing in severity, no 5/10, non-radiating, less sharp and more dull than before. Patient had a small dinner yesterday without nausea or vomiting. He had two episodes of diarrhea with increased urgency. Stool was dark but is dark at baseline since beginning iron supplementation. Per tele tech, there were tachycardia episodes around 10pm 2/26 when patient arrived at unit, and then 4:18-4:37am during and following episode of diarrhea. Heart rates were in the 150s during the episodes but increased to 171. Rhythm significant for paroxysmal atrial tachycardia.    Physical Exam:  General: patient lying in bed; well-appearing; no acute distress.  Mucosa: no mucosal pallor  Skin: no tenting of skin  Cardiac: holosystolic murmur at apex 3/6 grade, regular rate and rhythm  Respiratory: CTAB  Abdomen: soft, distended, tympanitic, normal bowel sounds in all quadrants, tender to palpation in LLQ, decreasing from prior exam  Extremities: no edema or cyanosis  Vascular: 2+ pulses in radial, DP, PT bilaterally    Results:  CBC: WBC 8.64, H/H 12.2/36.1, Plt 138    PTH 59.82  BMP: Na 140, K 4.4, Cl 106, CO2 22, BUN 23, Cr 1.78, Glucose 93, Ca 8.8, Mg 1.8, P 2.0,     Blood Culture: gram negative rods Patient interviewed at bedside. Patient states that abdominal pain remains in the LLQ and is decreasing in severity, now 4/10, non-radiating. Patient has not eaten since yesterday given diarrhea. He denies nausea or vomiting. Per tele tech, patient bradycardic overnight, primarily in 502 and 60s.    Physical Exam:  Vitals: Tmax 37.8, HR 70, /57, RR 18/98% on RA    General: patient lying in bed; well-appearing; no acute distress.  Mucosa: no mucosal pallor  Skin: no tenting of skin  Cardiac: holosystolic murmur at apex 3/6 grade, regular rate and rhythm  Respiratory: CTAB  Abdomen: soft, distended, tympanitic, normal bowel sounds in all quadrants, mild tender to palpation in LLQ,  Extremities: no edema or cyanosis  Vascular: 2+ pulses in radial, DP, PT bilaterally    Results:  CBC: WBC 5.68, H/H 11/34, Plt 145  BMP: Na 140, K 4.3, Cl 107, CO2 22, BUN 12, Cr 1.54, Glucose 91  Ca 8.7, Mg 1.7, P 2.4    Urine Na 121, Urine Cr 84.27, Urine Osm 515    Calculated FeNa: 1.6%  Calcualted CrCl: 41 mL/min    Blood Culture: E coli

## 2018-02-27 NOTE — PROGRESS NOTE ADULT - PROBLEM SELECTOR PLAN 6
-was hypotensive   -hold Home BP meds - per U/S Partially contracted gallbladder containing 1.5 cm nonmobile gallstone at   the gallbladder neck.  No evidence of acute cholecystitis.  -Continue to monitor, pt asymtomatic

## 2018-02-27 NOTE — PROGRESS NOTE ADULT - PROBLEM SELECTOR PLAN 3
-Blood cultures revealed gram negative rods  -s/p 3doses of Zosyn, continue q8hrs -Blood cultures revealed gram negative rods  -s/p 3doses of Zosyn, continue q8hrs  -Consult ID  -repeat blood cultures -acute uncomplicated diverticulitis found on abdominal CT (non-contrast)  -LLQ tenderness improving  -s/p 6 doses of Zosyn

## 2018-02-27 NOTE — PROGRESS NOTE ADULT - PROBLEM SELECTOR PLAN 5
-[id number]: 3  -patient s/p catheter ablation and on Xarelto at home  -Holding Xarelto due to CRYS, will restart when Creatinine clearance increases -[id number]: 3  -patient s/p catheter ablation and on Xarelto at home  -Holding Xarelto due to CRYS, will restart when Creatinine clearance increases  -restart Cardizem at 30mg q6hrs  -restart metoprolol tartrate at 50mg BID -[id number]: 3  -patient s/p catheter ablation and on Xarelto at home  -Xarelto at 15mg daily (CrCl at 41, can resume original dose when CrCl >50)  -Cardizem at 30mg q6hrs  -Metoprolol 25mg BID

## 2018-02-27 NOTE — PROGRESS NOTE ADULT - PROBLEM SELECTOR PLAN 5
- per U/S Partially contracted gallbladder containing 1.5 cm nonmobile gallstone at   the gallbladder neck.  No evidence of acute cholecystitis.  -Continue to monitor, pt asymtomatic -[id number] score of 3  -on xarelto at home  -will continue to hold given Cr elevation, will consider restarting at lower dose based in CrCl -[id number] score of 3  -on xarelto at home  -will continue to hold given Cr elevation, will consider restarting at lower dose based in CrCl  -Pt tachycardic, restarted Cardizem 30mg Q6hr and metoprolol 25BID

## 2018-02-27 NOTE — CONSULT NOTE ADULT - ASSESSMENT
77M HTN and Atrial fibrillation s/p ablation on Xarelto presenting after syncopal event found to have diverticulitis on imaging with Ecoli bacteremia.    Recommend:  - continue with zosyn until culture sensitivities result  - can switch to PO if oral option on sensitivities  - will need 10-14 day course 77M HTN and Atrial fibrillation s/p ablation on Xarelto, CKD stage 2, MGUS presenting after syncopal event found to have diverticulitis on CT with Ecoli bacteremia. Clinically improved with IV antibiotics.    Recommend:  - continue with zosyn until culture sensitivities result  - can switch to PO if oral option on sensitivities given rapid improvement clinically  - will need 10-14 day course 77M HTN and Atrial fibrillation s/p ablation on Xarelto, CKD stage 2, MGUS presenting after syncopal event found to have diverticulitis on CT with Ecoli bacteremia.  Rapid clinical improvement with IV antibiotics.    Recommend:  - continue with zosyn until culture sensitivities result  - can switch to PO if oral option on sensitivities given rapid improvement clinically  - will need 10-14 day course

## 2018-02-27 NOTE — PROGRESS NOTE ADULT - SUBJECTIVE AND OBJECTIVE BOX
Patient is a 77y old  Male who presents with a chief complaint of syncope (26 Feb 2018 15:43)      SUBJECTIVE / OVERNIGHT EVENTS:  Patient seen and examined at bedside. No acute events overnight.     MEDICATIONS  (STANDING):  gabapentin 300 milliGRAM(s) Oral at bedtime  heparin  Injectable 5000 Unit(s) SubCutaneous every 8 hours  piperacillin/tazobactam IVPB. 3.375 Gram(s) IV Intermittent every 8 hours  simvastatin 10 milliGRAM(s) Oral at bedtime  sodium chloride 0.9%. 1000 milliLiter(s) (100 mL/Hr) IV Continuous <Continuous>  tamsulosin 0.4 milliGRAM(s) Oral at bedtime    MEDICATIONS  (PRN):  acetaminophen   Tablet. 650 milliGRAM(s) Oral every 6 hours PRN mild and moderate pain      Vital Signs Last 24 Hrs  T(C): 37.3 (27 Feb 2018 05:16), Max: 37.8 (26 Feb 2018 17:04)  T(F): 99.1 (27 Feb 2018 05:16), Max: 100.1 (26 Feb 2018 20:09)  HR: 88 (27 Feb 2018 05:16) (64 - 97)  BP: 133/66 (27 Feb 2018 05:16) (102/59 - 141/71)  BP(mean): 73 (26 Feb 2018 15:43) (73 - 73)  RR: 18 (27 Feb 2018 05:16) (16 - 20)  SpO2: 98% (27 Feb 2018 05:16) (98% - 100%)  CAPILLARY BLOOD GLUCOSE        I&O's Summary    26 Feb 2018 07:01  -  27 Feb 2018 07:00  --------------------------------------------------------  IN: 0 mL / OUT: 650 mL / NET: -650 mL        PHYSICAL EXAM:  GENERAL: NAD, well-developed  HEAD:  Atraumatic, Normocephalic  EYES: EOMI,  conjunctiva and sclera clear  NECK: Supple  CHEST/LUNG: Clear to auscultation bilaterally; No wheeze  HEART: Regular rate and rhythm; No murmurs, rubs, or gallops  ABDOMEN: Soft, Nontender, Nondistended; Bowel sounds present  EXTREMITIES:  No clubbing, cyanosis, or edema  PSYCH: AAOx3  NEUROLOGY: non-focal  SKIN: No rashes or lesions    LABS:                        11.5   16.98 )-----------( 145      ( 26 Feb 2018 07:03 )             34.0     WBC Trend: 16.98<--, 20.41<--  02-26    138  |  104  |  42<H>  ----------------------------<  99  4.6   |  23  |  2.93<H>    Ca    8.6      26 Feb 2018 07:03  Phos  1.8     02-26  Mg     1.9     02-26    TPro  6.4  /  Alb  3.3  /  TBili  0.5  /  DBili  x   /  AST  25  /  ALT  16  /  AlkPhos  53  02-26    Creatinine Trend: 2.93<--, 3.75<--  PT/INR - ( 25 Feb 2018 22:58 )   PT: 23.1 SEC;   INR: 1.98          PTT - ( 25 Feb 2018 22:58 )  PTT:48.5 SEC  CARDIAC MARKERS ( 26 Feb 2018 13:22 )  x     / < 0.06 ng/mL / 617 u/L / 3.87 ng/mL / x      CARDIAC MARKERS ( 26 Feb 2018 07:03 )  x     / < 0.06 ng/mL / 440 u/L / 3.14 ng/mL / x      CARDIAC MARKERS ( 25 Feb 2018 22:58 )  x     / < 0.06 ng/mL / 201 u/L / 1.14 ng/mL / x                RADIOLOGY & ADDITIONAL TESTS:    Imaging Personally Reviewed:    Consultant(s) Notes Reviewed:      Care Discussed with Consultants/Other Providers: Patient is a 77y old  Male who presents with a chief complaint of syncope (26 Feb 2018 15:43)      SUBJECTIVE / OVERNIGHT EVENTS:  Patient interviewed at bedside. Patient states that abdominal pain remains in the LLQ and is decreasing in severity, no 5/10, non-radiating, less sharp and more dull than before. Patient had a small dinner yesterday without nausea or vomiting. He had two episodes of diarrhea with increased urgency. Stool was dark but is dark at baseline since beginning iron supplementation. Per tele tech, there were tachycardia episodes around 10pm 2/26 when patient arrived at unit, and then 4:18-4:37am during and following episode of diarrhea. Heart rates were in the 150s during the episodes but increased to 171. Rhythm significant for paroxysmal atrial tachycardia. Otherwise patient denies CP, N, V, urinary complaints and endorses feeling gassy.       MEDICATIONS  (STANDING):  gabapentin 300 milliGRAM(s) Oral at bedtime  heparin  Injectable 5000 Unit(s) SubCutaneous every 8 hours  piperacillin/tazobactam IVPB. 3.375 Gram(s) IV Intermittent every 8 hours  simvastatin 10 milliGRAM(s) Oral at bedtime  sodium chloride 0.9%. 1000 milliLiter(s) (100 mL/Hr) IV Continuous <Continuous>  tamsulosin 0.4 milliGRAM(s) Oral at bedtime    MEDICATIONS  (PRN):  acetaminophen   Tablet. 650 milliGRAM(s) Oral every 6 hours PRN mild and moderate pain      Vital Signs Last 24 Hrs  T(C): 37.3 (27 Feb 2018 05:16), Max: 37.8 (26 Feb 2018 17:04)  T(F): 99.1 (27 Feb 2018 05:16), Max: 100.1 (26 Feb 2018 20:09)  HR: 88 (27 Feb 2018 05:16) (64 - 97)  BP: 133/66 (27 Feb 2018 05:16) (102/59 - 141/71)  BP(mean): 73 (26 Feb 2018 15:43) (73 - 73)  RR: 18 (27 Feb 2018 05:16) (16 - 20)  SpO2: 98% (27 Feb 2018 05:16) (98% - 100%)  CAPILLARY BLOOD GLUCOSE        I&O's Summary    26 Feb 2018 07:01  -  27 Feb 2018 07:00  --------------------------------------------------------  IN: 0 mL / OUT: 650 mL / NET: -650 mL        PHYSICAL EXAM:  GENERAL: NAD, well-developed  HEAD:  Atraumatic, Normocephalic  EYES: EOMI,  conjunctiva and sclera clear  NECK: Supple  CHEST/LUNG: Clear to auscultation bilaterally; No wheeze  HEART: Regular rate and rhythm; No murmurs, rubs, or gallops  ABDOMEN: Soft, LLQ tendeness, Nondistended; Bowel sounds present  EXTREMITIES:  No clubbing, cyanosis, or edema  PSYCH: AAOx3  NEUROLOGY: non-focal  SKIN: No rashes or lesions    LABS:                        11.5   16.98 )-----------( 145      ( 26 Feb 2018 07:03 )             34.0     WBC Trend: 16.98<--, 20.41<--  02-26    138  |  104  |  42<H>  ----------------------------<  99  4.6   |  23  |  2.93<H>    Ca    8.6      26 Feb 2018 07:03  Phos  1.8     02-26  Mg     1.9     02-26    TPro  6.4  /  Alb  3.3  /  TBili  0.5  /  DBili  x   /  AST  25  /  ALT  16  /  AlkPhos  53  02-26    Creatinine Trend: 2.93<--, 3.75<--  PT/INR - ( 25 Feb 2018 22:58 )   PT: 23.1 SEC;   INR: 1.98          PTT - ( 25 Feb 2018 22:58 )  PTT:48.5 SEC  CARDIAC MARKERS ( 26 Feb 2018 13:22 )  x     / < 0.06 ng/mL / 617 u/L / 3.87 ng/mL / x      CARDIAC MARKERS ( 26 Feb 2018 07:03 )  x     / < 0.06 ng/mL / 440 u/L / 3.14 ng/mL / x      CARDIAC MARKERS ( 25 Feb 2018 22:58 )  x     / < 0.06 ng/mL / 201 u/L / 1.14 ng/mL / x                RADIOLOGY & ADDITIONAL TESTS:    Imaging Personally Reviewed: x    Consultant(s) Notes Reviewed:      Care Discussed with Consultants/Other Providers:

## 2018-02-27 NOTE — PROGRESS NOTE ADULT - ASSESSMENT
77 year-old male w/ PMH of HTN, MGUS, CKD stage 2-3a and AFib on Xarelto s/p ablation presenting for syncopal episode, found to have acute uncomplicated diverticulitis and gram-negative bakari septicemia. Patient s/p 3 doses of Zosyn. 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 Patient s/p 6 doses of Zosyn.

## 2018-02-27 NOTE — PROGRESS NOTE ADULT - PROBLEM SELECTOR PLAN 2
-S/p on dose of pip/tazo  -Continue on flagyl and cipro -Continue pip/tazo day two   -Pt with E.coli bacteremia   -Continue IVF hydration w/NS @ 100cc

## 2018-02-27 NOTE — PROGRESS NOTE ADULT - PROBLEM SELECTOR PLAN 1
-likely due to orthostasis  -patient initially hypotensive, blood pressure normalized s/p 3L of IVF -Blood cultures revealed E coli  -s/p 6doses of Zosyn, continue q8hrs  -follow-up ID recs  -repeat blood cultures

## 2018-02-27 NOTE — PROGRESS NOTE ADULT - PROBLEM SELECTOR PLAN 4
-Acute kidney injury on CKD stage 2-3a per PCP records  -renal ultrasound showed no hydronephrosis  -consider 15mmol of oral phosphate replacement for hypophosphatemia  -PTH WNL  -pending urine cultures, urine electrolytes -Acute kidney injury on CKD stage 2-3a per PCP records  -renal ultrasound showed no hydronephrosis  -hypophosphatemia resolving; if Phosphorus drops <2 consider 15mmol oral phosphate replacement  -PTH WNL  -pending urine cultures, urine electrolytes

## 2018-02-28 LAB
-  AMIKACIN: SIGNIFICANT CHANGE UP
-  AMPICILLIN/SULBACTAM: SIGNIFICANT CHANGE UP
-  AMPICILLIN: SIGNIFICANT CHANGE UP
-  AZTREONAM: SIGNIFICANT CHANGE UP
-  CEFAZOLIN: SIGNIFICANT CHANGE UP
-  CEFEPIME: SIGNIFICANT CHANGE UP
-  CEFOXITIN: SIGNIFICANT CHANGE UP
-  CEFTAZIDIME: SIGNIFICANT CHANGE UP
-  CEFTRIAXONE: SIGNIFICANT CHANGE UP
-  CIPROFLOXACIN: SIGNIFICANT CHANGE UP
-  ERTAPENEM: SIGNIFICANT CHANGE UP
-  GENTAMICIN: SIGNIFICANT CHANGE UP
-  IMIPENEM: SIGNIFICANT CHANGE UP
-  LEVOFLOXACIN: SIGNIFICANT CHANGE UP
-  MEROPENEM: SIGNIFICANT CHANGE UP
-  PIPERACILLIN/TAZOBACTAM: SIGNIFICANT CHANGE UP
-  TIGECYCLINE: SIGNIFICANT CHANGE UP
-  TOBRAMYCIN: SIGNIFICANT CHANGE UP
-  TRIMETHOPRIM/SULFAMETHOXAZOLE: SIGNIFICANT CHANGE UP
BACTERIA BLD CULT: SIGNIFICANT CHANGE UP
BASOPHILS # BLD AUTO: 0.05 K/UL — SIGNIFICANT CHANGE UP (ref 0–0.2)
BASOPHILS NFR BLD AUTO: 0.9 % — SIGNIFICANT CHANGE UP (ref 0–2)
BUN SERPL-MCNC: 12 MG/DL — SIGNIFICANT CHANGE UP (ref 7–23)
CALCIUM SERPL-MCNC: 8.7 MG/DL — SIGNIFICANT CHANGE UP (ref 8.4–10.5)
CHLORIDE SERPL-SCNC: 107 MMOL/L — SIGNIFICANT CHANGE UP (ref 98–107)
CO2 SERPL-SCNC: 22 MMOL/L — SIGNIFICANT CHANGE UP (ref 22–31)
CREAT SERPL-MCNC: 1.54 MG/DL — HIGH (ref 0.5–1.3)
E COLI DNA BLD POS QL NAA+NON-PROBE: SIGNIFICANT CHANGE UP
EOSINOPHIL # BLD AUTO: 0.19 K/UL — SIGNIFICANT CHANGE UP (ref 0–0.5)
EOSINOPHIL NFR BLD AUTO: 3.3 % — SIGNIFICANT CHANGE UP (ref 0–6)
GLUCOSE SERPL-MCNC: 91 MG/DL — SIGNIFICANT CHANGE UP (ref 70–99)
HCT VFR BLD CALC: 34 % — LOW (ref 39–50)
HGB BLD-MCNC: 11 G/DL — LOW (ref 13–17)
IMM GRANULOCYTES # BLD AUTO: 0.04 # — SIGNIFICANT CHANGE UP
IMM GRANULOCYTES NFR BLD AUTO: 0.7 % — SIGNIFICANT CHANGE UP (ref 0–1.5)
LYMPHOCYTES # BLD AUTO: 1.05 K/UL — SIGNIFICANT CHANGE UP (ref 1–3.3)
LYMPHOCYTES # BLD AUTO: 18.5 % — SIGNIFICANT CHANGE UP (ref 13–44)
MAGNESIUM SERPL-MCNC: 1.7 MG/DL — SIGNIFICANT CHANGE UP (ref 1.6–2.6)
MCHC RBC-ENTMCNC: 30.3 PG — SIGNIFICANT CHANGE UP (ref 27–34)
MCHC RBC-ENTMCNC: 32.4 % — SIGNIFICANT CHANGE UP (ref 32–36)
MCV RBC AUTO: 93.7 FL — SIGNIFICANT CHANGE UP (ref 80–100)
METHOD TYPE: SIGNIFICANT CHANGE UP
MONOCYTES # BLD AUTO: 0.72 K/UL — SIGNIFICANT CHANGE UP (ref 0–0.9)
MONOCYTES NFR BLD AUTO: 12.7 % — SIGNIFICANT CHANGE UP (ref 2–14)
NEUTROPHILS # BLD AUTO: 3.63 K/UL — SIGNIFICANT CHANGE UP (ref 1.8–7.4)
NEUTROPHILS NFR BLD AUTO: 63.9 % — SIGNIFICANT CHANGE UP (ref 43–77)
NRBC # FLD: 0 — SIGNIFICANT CHANGE UP
PHOSPHATE SERPL-MCNC: 2.4 MG/DL — LOW (ref 2.5–4.5)
PLATELET # BLD AUTO: 145 K/UL — LOW (ref 150–400)
PMV BLD: 10.7 FL — SIGNIFICANT CHANGE UP (ref 7–13)
POTASSIUM SERPL-MCNC: 4.3 MMOL/L — SIGNIFICANT CHANGE UP (ref 3.5–5.3)
POTASSIUM SERPL-SCNC: 4.3 MMOL/L — SIGNIFICANT CHANGE UP (ref 3.5–5.3)
RBC # BLD: 3.63 M/UL — LOW (ref 4.2–5.8)
RBC # FLD: 12.7 % — SIGNIFICANT CHANGE UP (ref 10.3–14.5)
SODIUM SERPL-SCNC: 140 MMOL/L — SIGNIFICANT CHANGE UP (ref 135–145)
SPECIMEN SOURCE: SIGNIFICANT CHANGE UP
SPECIMEN SOURCE: SIGNIFICANT CHANGE UP
WBC # BLD: 5.68 K/UL — SIGNIFICANT CHANGE UP (ref 3.8–10.5)
WBC # FLD AUTO: 5.68 K/UL — SIGNIFICANT CHANGE UP (ref 3.8–10.5)

## 2018-02-28 PROCEDURE — 99233 SBSQ HOSP IP/OBS HIGH 50: CPT | Mod: GC

## 2018-02-28 PROCEDURE — 99232 SBSQ HOSP IP/OBS MODERATE 35: CPT

## 2018-02-28 RX ADMIN — Medication 25 MILLIGRAM(S): at 05:44

## 2018-02-28 RX ADMIN — RIVAROXABAN 15 MILLIGRAM(S): KIT at 18:16

## 2018-02-28 RX ADMIN — HEPARIN SODIUM 5000 UNIT(S): 5000 INJECTION INTRAVENOUS; SUBCUTANEOUS at 13:56

## 2018-02-28 RX ADMIN — SIMVASTATIN 10 MILLIGRAM(S): 20 TABLET, FILM COATED ORAL at 21:35

## 2018-02-28 RX ADMIN — PIPERACILLIN AND TAZOBACTAM 25 GRAM(S): 4; .5 INJECTION, POWDER, LYOPHILIZED, FOR SOLUTION INTRAVENOUS at 21:37

## 2018-02-28 RX ADMIN — Medication 25 MILLIGRAM(S): at 18:17

## 2018-02-28 RX ADMIN — PIPERACILLIN AND TAZOBACTAM 25 GRAM(S): 4; .5 INJECTION, POWDER, LYOPHILIZED, FOR SOLUTION INTRAVENOUS at 13:56

## 2018-02-28 RX ADMIN — HEPARIN SODIUM 5000 UNIT(S): 5000 INJECTION INTRAVENOUS; SUBCUTANEOUS at 05:44

## 2018-02-28 RX ADMIN — TAMSULOSIN HYDROCHLORIDE 0.4 MILLIGRAM(S): 0.4 CAPSULE ORAL at 21:35

## 2018-02-28 RX ADMIN — HEPARIN SODIUM 5000 UNIT(S): 5000 INJECTION INTRAVENOUS; SUBCUTANEOUS at 21:35

## 2018-02-28 RX ADMIN — PIPERACILLIN AND TAZOBACTAM 25 GRAM(S): 4; .5 INJECTION, POWDER, LYOPHILIZED, FOR SOLUTION INTRAVENOUS at 05:45

## 2018-02-28 RX ADMIN — GABAPENTIN 300 MILLIGRAM(S): 400 CAPSULE ORAL at 21:35

## 2018-02-28 RX ADMIN — Medication 62.5 MILLIMOLE(S): at 10:40

## 2018-02-28 NOTE — PROGRESS NOTE ADULT - ASSESSMENT
77 year-old male w/ PMH of HTN, MGUS, CKD giovanny 2-3a and AFib on Xarelto s/p ablation presenting for syncopal episode. On presentation patient was found to be hypotensive in the setting of LLQ pain with leukocytosis but non tachycardic and afebrile. Patient also has cholelithiasis on imaging.  Patient currently with CRYS on CKD stage 2-3a and uncomplicated diverticulitis per CT findings now on zosyn given E.coli bacteremia and Sepsis.

## 2018-02-28 NOTE — PROGRESS NOTE ADULT - SUBJECTIVE AND OBJECTIVE BOX
Patient is a 77y old  Male who presents with a chief complaint of syncope (26 Feb 2018 15:43)    f/u diverticulitis with bacteremia    interval history/ROS:  no fever/chills.  still feels quite bloated.  no n/v/d.  constipated.  on liquid diet.  Rest of ROS otherwise negative.    Allergies  No Known Allergies    Antimicrobials:  piperacillin/tazobactam IVPB. 3.375 Gram(s) IV Intermittent every 8 hours (2/26-)    MEDICATIONS  (STANDING):  diltiazem    Tablet 30 every 6 hours  gabapentin 300 at bedtime  heparin  Injectable 5000 every 8 hours  metoprolol     tartrate 25 two times a day  rivaroxaban 15 every 24 hours  simvastatin 10 at bedtime  tamsulosin 0.4 at bedtime    Vital Signs Last 24 Hrs  T(F): 99.6 (02-28-18 @ 12:56), Max: 100.2 (02-27-18 @ 18:35)  HR: 56 (02-28-18 @ 12:56)  BP: 152/75 (02-28-18 @ 12:56)  RR: 17 (02-28-18 @ 12:56)  SpO2: 100% (02-28-18 @ 12:56) (98% - 100%)  Wt(kg): --    PHYSICAL EXAM:  General: non-toxic  HEAD/EYES: anicteric  ENT:  supple  Cardiovascular:   S1, S2  Respiratory:  clear bilaterally  GI:  soft, non-tender, distended  :  no cohen  Musculoskeletal:  no synovitis  Neurologic:  grossly non-focal  Skin:  no rash  Lymph: no lymphadenopathy  Psychiatric:  appropriate affect  Vascular:  no phlebitis                        11.0   5.68  )-----------( 145      ( 28 Feb 2018 06:50 )             34.0 02-28    140  |  107  |  12  ----------------------------<  91  4.3   |  22  |  1.54  Ca    8.7      28 Feb 2018 06:50Phos  2.4     02-28Mg     1.7     02-28      RECENT CULTURES:  Culture - Blood (02.26.18 @ 02:13)  Escherichia coli    -  Amikacin: S <=16 BUD    -  Ampicillin: R >16 BUD    -  Ampicillin/Sulbactam: R >16/8 BUD    -  Aztreonam: S <=4 BUD    -  Cefazolin: S <=8 BUD    -  Cefepime: S <=4 BUD    -  Cefoxitin: S <=8 BUD    -  Ceftazidime: S <=1 BUD    -  Ceftriaxone: S <=1 BUD    -  Ciprofloxacin: S <=1 BUD    -  Ertapenem: S <=1 BUD    -  Gentamicin: S <=4 BUD    -  Imipenem: S <=1 BUD    -  Levofloxacin: S <=2 BUD    -  Meropenem: S <=1 BUD    -  Piperacillin/Tazobactam: S <=16 BUD    -  Tigecycline: S <=2 BUD    -  Tobramycin: S <=4 BUD    -  Trimethoprim/Sulfamethoxazole: S <=2/38 BUD    Radiology:   CT Abdomen and Pelvis No Cont (02.26.18 @ 01:45)  BOWEL: There is a focal wall thickening with pericolic stranding (2-53) of the midportion of the descending colon. No bowel obstruction. Appendix  was surgically removed.

## 2018-02-28 NOTE — PROGRESS NOTE ADULT - SUBJECTIVE AND OBJECTIVE BOX
Patient interviewed at bedside. Patient states that abdominal pain remains in the LLQ and is decreasing in severity, now 4/10, non-radiating. Patient has not eaten since yesterday given diarrhea. He denies nausea or vomiting. Per tele tech, patient bradycardic overnight, primarily in 502 and 60s.    Physical Exam:  Vitals: Tmax 37.8, HR 70, /57, RR 18/98% on RA    General: patient lying in bed; well-appearing; no acute distress.  Mucosa: no mucosal pallor  Skin: no tenting of skin  Cardiac: holosystolic murmur at apex 3/6 grade, regular rate and rhythm  Respiratory: CTAB  Abdomen: soft, distended, tympanitic, normal bowel sounds in all quadrants, mild tender to palpation in LLQ,  Extremities: no edema or cyanosis  Vascular: 2+ pulses in radial, DP, PT bilaterally    Results:  CBC: WBC 5.68, H/H 11/34, Plt 145  BMP: Na 140, K 4.3, Cl 107, CO2 22, BUN 12, Cr 1.54, Glucose 91  Ca 8.7, Mg 1.7, P 2.4    Urine Na 121, Urine Cr 84.27, Urine Osm 515    Calculated FeNa: 1.6%  Calcualted CrCl: 41 mL/min    Blood Culture: E coli

## 2018-02-28 NOTE — PROGRESS NOTE ADULT - PROBLEM SELECTOR PLAN 3
-acute uncomplicated diverticulitis found on abdominal CT (non-contrast)  -LLQ tenderness improving  -s/p 6 doses of Zosyn

## 2018-02-28 NOTE — PROGRESS NOTE ADULT - PROBLEM SELECTOR PLAN 3
-CRYS on CKD stage 2-3a per PCP records, improving Cr  - CRYS likely in the setting of hypotension   -Continue IVF hydration  -no evidence of hydro on renal u/s  -urine lytes pending  -NS @ 100cc -CRYS on CKD stage 2-3a per PCP records, improving Cr today 1.54  - CRYS likely in the setting of hypotension   -no evidence of hydro on renal u/s

## 2018-02-28 NOTE — PROGRESS NOTE ADULT - PROBLEM SELECTOR PLAN 1
-Pt with E.coli bacteremia in the setting of diverticulitis, coming in with hypotension and now with episodes of tachycardia   -Today Day 2 of piptazo  -Blood cultures ordered   -continue IVF -improving  -Pt with E.coli bacteremia in the setting of diverticulitis, coming in with hypotension and recent episodes of tachycardia  -No longer tachycardic  -Today Day 3 of piptazo  -Blood cultures reordered pending

## 2018-02-28 NOTE — PROGRESS NOTE ADULT - PROBLEM SELECTOR PLAN 2
-Continue pip/tazo day two   -Pt with E.coli bacteremia   -Continue IVF hydration w/NS @ 100cc -Continue pip/tazo day 3  -Pt with E.coli bacteremia   -s/p IVF hydration w/NS @ 100cc for 24hrs  -advanced diet to soft

## 2018-02-28 NOTE — PROGRESS NOTE ADULT - PROBLEM SELECTOR PLAN 5
-[id number] score of 3  -on xarelto at home  -will continue to hold given Cr elevation, will consider restarting at lower dose based in CrCl  -Pt tachycardic, restarted Cardizem 30mg Q6hr and metoprolol 25BID -[id number] score of 3  -on xarelto at home, will continue renally dosed  -Pt tachycardic, restarted Cardizem 30mg Q6hr and metoprolol 25BID; will adjust as needed

## 2018-02-28 NOTE — PROGRESS NOTE ADULT - SUBJECTIVE AND OBJECTIVE BOX
Patient is a 77y old  Male who presents with a chief complaint of syncope (2018 15:43)      SUBJECTIVE / OVERNIGHT EVENTS:  Patient seen and examined at bedside. No acute events overnight.     MEDICATIONS  (STANDING):  diltiazem    Tablet 30 milliGRAM(s) Oral every 6 hours  gabapentin 300 milliGRAM(s) Oral at bedtime  heparin  Injectable 5000 Unit(s) SubCutaneous every 8 hours  metoprolol     tartrate 25 milliGRAM(s) Oral two times a day  piperacillin/tazobactam IVPB. 3.375 Gram(s) IV Intermittent every 8 hours  rivaroxaban 15 milliGRAM(s) Oral every 24 hours  simvastatin 10 milliGRAM(s) Oral at bedtime  sodium chloride 0.9%. 1000 milliLiter(s) (100 mL/Hr) IV Continuous <Continuous>  tamsulosin 0.4 milliGRAM(s) Oral at bedtime    MEDICATIONS  (PRN):  acetaminophen   Tablet. 650 milliGRAM(s) Oral every 6 hours PRN mild and moderate pain      Vital Signs Last 24 Hrs  T(C): 37.8 (2018 05:40), Max: 37.9 (2018 18:35)  T(F): 100.1 (2018 05:40), Max: 100.2 (2018 18:35)  HR: 70 (2018 05:40) (62 - 150)  BP: 139/57 (2018 05:40) (133/68 - 158/89)  BP(mean): --  RR: 18 (2018 05:40) (18 - 18)  SpO2: 98% (2018 05:40) (98% - 99%)  CAPILLARY BLOOD GLUCOSE        I&O's Summary    2018 07:01  -  2018 07:00  --------------------------------------------------------  IN: 0 mL / OUT: 480 mL / NET: -480 mL        PHYSICAL EXAM:  GENERAL: NAD, well-developed  HEAD:  Atraumatic, Normocephalic  EYES: EOMI,  conjunctiva and sclera clear  NECK: Supple  CHEST/LUNG: Clear to auscultation bilaterally; No wheeze  HEART: Regular rate and rhythm; No murmurs, rubs, or gallops  ABDOMEN: Soft, Nontender, Nondistended; Bowel sounds present  EXTREMITIES:  No clubbing, cyanosis, or edema  PSYCH: AAOx3  NEUROLOGY: non-focal  SKIN: No rashes or lesions    LABS:                        11.0   5.68  )-----------( 145      ( 2018 06:50 )             34.0     WBC Trend: 5.68<--, 8.64<--, 16.98<--      140  |  107  |  12  ----------------------------<  91  4.3   |  22  |  1.54<H>    Ca    8.7      2018 06:50  Phos  2.4       Mg     1.7           Creatinine Trend: 1.54<--, 1.78<--, 2.93<--, 3.75<--    CARDIAC MARKERS ( 2018 06:30 )  x     / x     / 675 u/L / x     / x      CARDIAC MARKERS ( 2018 13:22 )  x     / < 0.06 ng/mL / 617 u/L / 3.87 ng/mL / x          Urinalysis Basic - ( 2018 15:16 )    Color: PLYEL / Appearance: CLEAR / S.017 / pH: 6.0  Gluc: NEGATIVE / Ketone: NEGATIVE  / Bili: NEGATIVE / Urobili: NORMAL mg/dL   Blood: TRACE / Protein: 100 mg/dL / Nitrite: NEGATIVE   Leuk Esterase: NEGATIVE / RBC: 0-2 / WBC 0-2   Sq Epi: OCC / Non Sq Epi: x / Bacteria: x          RADIOLOGY & ADDITIONAL TESTS:    Imaging Personally Reviewed:    Consultant(s) Notes Reviewed:      Care Discussed with Consultants/Other Providers: Patient is a 77y old  Male who presents with a chief complaint of syncope (2018 15:43)      SUBJECTIVE / OVERNIGHT EVENTS:  Patient seen and examined at bedside. No acute events overnight. Patient states that abdominal pain remains in the LLQ and is decreasing in severity, now 4/10, non-radiating. Patient has not eaten since yesterday given diarrhea. Patient does s not get abdominal pain after eating. He denies nausea or vomiting. Patient denies CP, SOB, palpitations and endorses some chills.      MEDICATIONS  (STANDING):  diltiazem    Tablet 30 milliGRAM(s) Oral every 6 hours  gabapentin 300 milliGRAM(s) Oral at bedtime  heparin  Injectable 5000 Unit(s) SubCutaneous every 8 hours  metoprolol     tartrate 25 milliGRAM(s) Oral two times a day  piperacillin/tazobactam IVPB. 3.375 Gram(s) IV Intermittent every 8 hours  rivaroxaban 15 milliGRAM(s) Oral every 24 hours  simvastatin 10 milliGRAM(s) Oral at bedtime  sodium chloride 0.9%. 1000 milliLiter(s) (100 mL/Hr) IV Continuous <Continuous>  tamsulosin 0.4 milliGRAM(s) Oral at bedtime    MEDICATIONS  (PRN):  acetaminophen   Tablet. 650 milliGRAM(s) Oral every 6 hours PRN mild and moderate pain      Vital Signs Last 24 Hrs  T(C): 37.8 (2018 05:40), Max: 37.9 (2018 18:35)  T(F): 100.1 (2018 05:40), Max: 100.2 (2018 18:35)  HR: 70 (2018 05:40) (62 - 150)  BP: 139/57 (2018 05:40) (133/68 - 158/89)  BP(mean): --  RR: 18 (2018 05:40) (18 - 18)  SpO2: 98% (2018 05:40) (98% - 99%)  CAPILLARY BLOOD GLUCOSE        I&O's Summary    2018 07:01  -  2018 07:00  --------------------------------------------------------  IN: 0 mL / OUT: 480 mL / NET: -480 mL      tele HR in 60's kathleen down to 58 no events.  PHYSICAL EXAM:  GENERAL: NAD, well-developed  HEAD:  Atraumatic, Normocephalic  EYES: EOMI,  conjunctiva and sclera clear  NECK: Supple  CHEST/LUNG: Clear to auscultation bilaterally; No wheeze  HEART: Regular rate and rhythm; 2/6 systolic murmurs,  no rubs, or gallops  ABDOMEN: Soft, LLQ tenderness, Nondistended; Bowel sounds present  EXTREMITIES:  No clubbing, cyanosis, or edema  PSYCH: AAOx3  NEUROLOGY: non-focal  SKIN: No rashes or lesions    LABS:                        11.0   5.68  )-----------( 145      ( 2018 06:50 )             34.0     WBC Trend: 5.68<--, 8.64<--, 16.98<--      140  |  107  |  12  ----------------------------<  91  4.3   |  22  |  1.54<H>    Ca    8.7      2018 06:50  Phos  2.4     -  Mg     1.7           Creatinine Trend: 1.54<--, 1.78<--, 2.93<--, 3.75<--    CARDIAC MARKERS ( 2018 06:30 )  x     / x     / 675 u/L / x     / x      CARDIAC MARKERS ( 2018 13:22 )  x     / < 0.06 ng/mL / 617 u/L / 3.87 ng/mL / x          Urinalysis Basic - ( 2018 15:16 )    Color: PLYEL / Appearance: CLEAR / S.017 / pH: 6.0  Gluc: NEGATIVE / Ketone: NEGATIVE  / Bili: NEGATIVE / Urobili: NORMAL mg/dL   Blood: TRACE / Protein: 100 mg/dL / Nitrite: NEGATIVE   Leuk Esterase: NEGATIVE / RBC: 0-2 / WBC 0-2   Sq Epi: OCC / Non Sq Epi: x / Bacteria: x          RADIOLOGY & ADDITIONAL TESTS:    Imaging Personally Reviewed:    Consultant(s) Notes Reviewed:      Care Discussed with Consultants/Other Providers:

## 2018-02-28 NOTE — PROGRESS NOTE ADULT - PROBLEM SELECTOR PLAN 5
-[id number]: 3  -patient s/p catheter ablation and on Xarelto at home  -Xarelto at 15mg daily (CrCl at 41, can resume original dose when CrCl >50)  -Cardizem at 30mg q6hrs  -Metoprolol 25mg BID

## 2018-02-28 NOTE — PROGRESS NOTE ADULT - ASSESSMENT
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 Patient s/p 6 doses of Zosyn.

## 2018-02-28 NOTE — PROGRESS NOTE ADULT - PROBLEM SELECTOR PLAN 1
-Blood cultures revealed E coli  -s/p 6doses of Zosyn, continue q8hrs  -follow-up ID recs  -repeat blood cultures

## 2018-02-28 NOTE — PROGRESS NOTE ADULT - PROBLEM SELECTOR PLAN 6
- per U/S Partially contracted gallbladder containing 1.5 cm nonmobile gallstone at   the gallbladder neck.  No evidence of acute cholecystitis.  -Continue to monitor, pt asymtomatic - per U/S Partially contracted gallbladder containing 1.5 cm nonmobile gallstone at   the gallbladder neck.  No evidence of acute cholecystitis.  -Continue to monitor, pt asymptomatic

## 2018-02-28 NOTE — PROGRESS NOTE ADULT - PROBLEM SELECTOR PLAN 4
-Acute kidney injury on CKD stage 2-3a per PCP records  -renal ultrasound showed no hydronephrosis  -hypophosphatemia resolving; if Phosphorus drops <2 consider 15mmol oral phosphate replacement  -PTH WNL  -pending urine cultures, urine electrolytes

## 2018-03-01 DIAGNOSIS — Z02.9 ENCOUNTER FOR ADMINISTRATIVE EXAMINATIONS, UNSPECIFIED: ICD-10-CM

## 2018-03-01 LAB
-  AMIKACIN: SIGNIFICANT CHANGE UP
-  AMPICILLIN/SULBACTAM: SIGNIFICANT CHANGE UP
-  AMPICILLIN: SIGNIFICANT CHANGE UP
-  AZTREONAM: SIGNIFICANT CHANGE UP
-  CEFAZOLIN: SIGNIFICANT CHANGE UP
-  CEFEPIME: SIGNIFICANT CHANGE UP
-  CEFOXITIN: SIGNIFICANT CHANGE UP
-  CEFTAZIDIME: SIGNIFICANT CHANGE UP
-  CEFTRIAXONE: SIGNIFICANT CHANGE UP
-  CIPROFLOXACIN: SIGNIFICANT CHANGE UP
-  ERTAPENEM: SIGNIFICANT CHANGE UP
-  GENTAMICIN: SIGNIFICANT CHANGE UP
-  IMIPENEM: SIGNIFICANT CHANGE UP
-  LEVOFLOXACIN: SIGNIFICANT CHANGE UP
-  MEROPENEM: SIGNIFICANT CHANGE UP
-  PIPERACILLIN/TAZOBACTAM: SIGNIFICANT CHANGE UP
-  TIGECYCLINE: SIGNIFICANT CHANGE UP
-  TOBRAMYCIN: SIGNIFICANT CHANGE UP
-  TRIMETHOPRIM/SULFAMETHOXAZOLE: SIGNIFICANT CHANGE UP
BUN SERPL-MCNC: 7 MG/DL — SIGNIFICANT CHANGE UP (ref 7–23)
CALCIUM SERPL-MCNC: 8.9 MG/DL — SIGNIFICANT CHANGE UP (ref 8.4–10.5)
CHLORIDE SERPL-SCNC: 104 MMOL/L — SIGNIFICANT CHANGE UP (ref 98–107)
CO2 SERPL-SCNC: 25 MMOL/L — SIGNIFICANT CHANGE UP (ref 22–31)
CREAT SERPL-MCNC: 1.47 MG/DL — HIGH (ref 0.5–1.3)
GLUCOSE SERPL-MCNC: 93 MG/DL — SIGNIFICANT CHANGE UP (ref 70–99)
HCT VFR BLD CALC: 35.4 % — LOW (ref 39–50)
HGB BLD-MCNC: 11.6 G/DL — LOW (ref 13–17)
MAGNESIUM SERPL-MCNC: 1.7 MG/DL — SIGNIFICANT CHANGE UP (ref 1.6–2.6)
MCHC RBC-ENTMCNC: 30.8 PG — SIGNIFICANT CHANGE UP (ref 27–34)
MCHC RBC-ENTMCNC: 32.8 % — SIGNIFICANT CHANGE UP (ref 32–36)
MCV RBC AUTO: 93.9 FL — SIGNIFICANT CHANGE UP (ref 80–100)
METHOD TYPE: SIGNIFICANT CHANGE UP
NRBC # FLD: 0 — SIGNIFICANT CHANGE UP
ORGANISM # SPEC MICROSCOPIC CNT: SIGNIFICANT CHANGE UP
ORGANISM # SPEC MICROSCOPIC CNT: SIGNIFICANT CHANGE UP
PHOSPHATE SERPL-MCNC: 2.8 MG/DL — SIGNIFICANT CHANGE UP (ref 2.5–4.5)
PLATELET # BLD AUTO: 157 K/UL — SIGNIFICANT CHANGE UP (ref 150–400)
PMV BLD: 10.6 FL — SIGNIFICANT CHANGE UP (ref 7–13)
POTASSIUM SERPL-MCNC: 3.9 MMOL/L — SIGNIFICANT CHANGE UP (ref 3.5–5.3)
POTASSIUM SERPL-SCNC: 3.9 MMOL/L — SIGNIFICANT CHANGE UP (ref 3.5–5.3)
RBC # BLD: 3.77 M/UL — LOW (ref 4.2–5.8)
RBC # FLD: 12.7 % — SIGNIFICANT CHANGE UP (ref 10.3–14.5)
SODIUM SERPL-SCNC: 139 MMOL/L — SIGNIFICANT CHANGE UP (ref 135–145)
WBC # BLD: 6.01 K/UL — SIGNIFICANT CHANGE UP (ref 3.8–10.5)
WBC # FLD AUTO: 6.01 K/UL — SIGNIFICANT CHANGE UP (ref 3.8–10.5)

## 2018-03-01 PROCEDURE — 99233 SBSQ HOSP IP/OBS HIGH 50: CPT | Mod: GC

## 2018-03-01 PROCEDURE — 99232 SBSQ HOSP IP/OBS MODERATE 35: CPT

## 2018-03-01 RX ADMIN — HEPARIN SODIUM 5000 UNIT(S): 5000 INJECTION INTRAVENOUS; SUBCUTANEOUS at 21:59

## 2018-03-01 RX ADMIN — RIVAROXABAN 15 MILLIGRAM(S): KIT at 17:47

## 2018-03-01 RX ADMIN — TAMSULOSIN HYDROCHLORIDE 0.4 MILLIGRAM(S): 0.4 CAPSULE ORAL at 21:59

## 2018-03-01 RX ADMIN — PIPERACILLIN AND TAZOBACTAM 25 GRAM(S): 4; .5 INJECTION, POWDER, LYOPHILIZED, FOR SOLUTION INTRAVENOUS at 05:11

## 2018-03-01 RX ADMIN — HEPARIN SODIUM 5000 UNIT(S): 5000 INJECTION INTRAVENOUS; SUBCUTANEOUS at 05:10

## 2018-03-01 RX ADMIN — PIPERACILLIN AND TAZOBACTAM 25 GRAM(S): 4; .5 INJECTION, POWDER, LYOPHILIZED, FOR SOLUTION INTRAVENOUS at 13:53

## 2018-03-01 RX ADMIN — PIPERACILLIN AND TAZOBACTAM 25 GRAM(S): 4; .5 INJECTION, POWDER, LYOPHILIZED, FOR SOLUTION INTRAVENOUS at 21:59

## 2018-03-01 RX ADMIN — HEPARIN SODIUM 5000 UNIT(S): 5000 INJECTION INTRAVENOUS; SUBCUTANEOUS at 13:54

## 2018-03-01 RX ADMIN — SIMVASTATIN 10 MILLIGRAM(S): 20 TABLET, FILM COATED ORAL at 21:59

## 2018-03-01 RX ADMIN — Medication 25 MILLIGRAM(S): at 05:11

## 2018-03-01 RX ADMIN — Medication 25 MILLIGRAM(S): at 17:47

## 2018-03-01 RX ADMIN — GABAPENTIN 300 MILLIGRAM(S): 400 CAPSULE ORAL at 21:59

## 2018-03-01 NOTE — PROGRESS NOTE ADULT - ASSESSMENT
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 Patient s/p 6 doses of Zosyn. 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 Patient on day 4 of Zosyn.

## 2018-03-01 NOTE — DISCHARGE NOTE ADULT - PLAN OF CARE
Resolve bacteremia Patient's blood cultures revealed E. coli bacteremia upon presentation. Blood cultures are now unremarkable and patient is on day 4 of IV Zosyn treatment.  Patient should complete a 10-day course of oral levofloxacin (daily) and oral metronidazole (every 6-8 hours). Resolving acute diverticulitis Patient initially presented with left-lower quadrant tenderness and CT findings revealing acute, uncomplicated diverticulitis. Patient completed 4 days of IV Zosyn treatment and tenderness has resolved.    Patient should continue to advance diet from soft foods to regular diet over the course of 2-3 days. Syncope prevention Patient's syncopal event likely secondary to orthostasis.  Patient should remain hydrated and well-nourished. Atrial Fibrillation Patient has [id number] score of 3 on Xarelto and status-post ablation. Patient has been resumed on Xarelto at 15mg. Patient rate controlled and should continue diltiazem at 30 mg every 6 hours and metoprolol 25 mg twice a day. Hypertension Blood pressure has been well-controlled on diltiazem 30 mg every 6 hours and metoprolol 25 mg twice a day. Continue regimen at home. Cholelithiasis Patient found to have 1.5cm gallstone on abdominal ultrasound with no signs of acute cholecystitis. No follow-up required. Patient's blood cultures revealed E. coli bacteremia upon presentation. Blood cultures are now unremarkable and patient is on day 4 of IV Zosyn treatment.  Patient should complete a 10-day course of oral ciprofloxacin (twice a day) and oral metronidazole (every 6-8 hours). Achieve adequate anticoagulation and maintain heart rates under 100 Maintain control Monitor Prevention Achieve adequate anticoagulation and maintain heart rates under 110 The patient found to have 1.5cm gallstone on an abdominal ultrasound. There is no sign of an acute disease and no follow-up is required except to mention this new medical information to your primary care provider. Remove infection from blood Bacteria was found in the patient's blood which was then treated with 4 days of antibiotics.  Patient should complete 10 days of oral antibiotics. The antibiotics prescribed are ciprofloxacin, taken twice a day, and metronidazole taken every 6-8 hours. Remove infection of intestine The patient was found to have infection of the intestine on a CT scan of the abdomen which was then treated with 4 days of antibiotics. The infection resolved by the decrease in abdominal pain. At home, the patient should continue to transition his diet from soft foods to regular diet over the course of 2-3 days. Prevent another "fainting" episode The patient's syncopal event, or "fainting" was likely due to dehydration.  Patient should remain hydrated and well-nourished and be sure to gradually switch between lying down, sitting upright, and standing up. Continue blood thinner medication and keep heart rate within a normal range The patient's atrial fibrillation was controlled in the hospital with blood pressure medications (diltiazem and metoprolol) to keep the heart rate in normal limits. Xarelto was used for blood thinning to prevent formation of a blood clot. Keep the blood pressure within a normal range The patient's blood pressure has been well-controlled with his home medications (diltiazem and metoprolol). These medications should be continued at home Monitor and add to health records Bacteria was found in the patient's blood which was then treated with 4 days of antibiotics.  Patient should complete 10 days of oral antibiotics. The antibiotics prescribed are ciprofloxacin, taken twice a day, and metronidazole taken every 6-8 hours. Please follow up with your primary care doctor within a week of discharge. The patient was found to have infection of the intestine on a CT scan of the abdomen which was then treated with 4 days of antibiotics. The infection resolved by the decrease in abdominal pain. At home, the patient should continue to transition his diet from soft foods to regular diet over the course of 2-3 days.  Please follow up with your primary care doctor within a week of discharge. The patient's syncopal event, or "fainting" was likely due to dehydration.  Patient should remain hydrated and well-nourished and be sure to gradually switch between lying down, sitting upright, and standing up.  Please follow up with your primary care doctor within a week of discharge. The patient's blood pressure has been well-controlled with his home medications (diltiazem and metoprolol). These medications should be continued at home.

## 2018-03-01 NOTE — PROGRESS NOTE ADULT - SUBJECTIVE AND OBJECTIVE BOX
Patient is a 77y old  Male who presents with a chief complaint of syncope (01 Mar 2018 08:30)      SUBJECTIVE / OVERNIGHT EVENTS:  Patient seen and examined at bedside. No acute events overnight.     MEDICATIONS  (STANDING):  diltiazem    Tablet 30 milliGRAM(s) Oral every 6 hours  gabapentin 300 milliGRAM(s) Oral at bedtime  heparin  Injectable 5000 Unit(s) SubCutaneous every 8 hours  metoprolol     tartrate 25 milliGRAM(s) Oral two times a day  piperacillin/tazobactam IVPB. 3.375 Gram(s) IV Intermittent every 8 hours  rivaroxaban 15 milliGRAM(s) Oral every 24 hours  simvastatin 10 milliGRAM(s) Oral at bedtime  sodium chloride 0.9%. 1000 milliLiter(s) (100 mL/Hr) IV Continuous <Continuous>  tamsulosin 0.4 milliGRAM(s) Oral at bedtime    MEDICATIONS  (PRN):  acetaminophen   Tablet. 650 milliGRAM(s) Oral every 6 hours PRN mild and moderate pain      Vital Signs Last 24 Hrs  T(C): 37.4 (01 Mar 2018 05:05), Max: 37.6 (2018 12:56)  T(F): 99.3 (01 Mar 2018 05:05), Max: 99.6 (2018 12:56)  HR: 62 (01 Mar 2018 05:05) (56 - 70)  BP: 133/60 (01 Mar 2018 05:05) (131/63 - 152/75)  BP(mean): --  RR: 17 (01 Mar 2018 05:05) (16 - 17)  SpO2: 97% (01 Mar 2018 05:05) (97% - 100%)  CAPILLARY BLOOD GLUCOSE        I&O's Summary    2018 07:  -  01 Mar 2018 07:00  --------------------------------------------------------  IN: 350 mL / OUT: 1100 mL / NET: -750 mL    01 Mar 2018 07:  -  01 Mar 2018 11:27  --------------------------------------------------------  IN: 0 mL / OUT: 600 mL / NET: -600 mL        PHYSICAL EXAM:  GENERAL: NAD, well-developed  HEAD:  Atraumatic, Normocephalic  EYES: EOMI,  conjunctiva and sclera clear  NECK: Supple  CHEST/LUNG: Clear to auscultation bilaterally; No wheeze  HEART: Regular rate and rhythm; No murmurs, rubs, or gallops  ABDOMEN: Soft, Nontender, Nondistended; Bowel sounds present  EXTREMITIES:  No clubbing, cyanosis, or edema  PSYCH: AAOx3  NEUROLOGY: non-focal  SKIN: No rashes or lesions    LABS:                        11.6   6.01  )-----------( 157      ( 01 Mar 2018 06:00 )             35.4     WBC Trend: 6.01<--, 5.68<--, 8.64<--  03-01    139  |  104  |  7   ----------------------------<  93  3.9   |  25  |  1.47<H>    Ca    8.9      01 Mar 2018 06:00  Phos  2.8     03-01  Mg     1.7     03-01      Creatinine Trend: 1.47<--, 1.54<--, 1.78<--, 2.93<--, 3.75<--        Urinalysis Basic - ( 2018 15:16 )    Color: PLYEL / Appearance: CLEAR / S.017 / pH: 6.0  Gluc: NEGATIVE / Ketone: NEGATIVE  / Bili: NEGATIVE / Urobili: NORMAL mg/dL   Blood: TRACE / Protein: 100 mg/dL / Nitrite: NEGATIVE   Leuk Esterase: NEGATIVE / RBC: 0-2 / WBC 0-2   Sq Epi: OCC / Non Sq Epi: x / Bacteria: x          RADIOLOGY & ADDITIONAL TESTS:    Imaging Personally Reviewed:    Consultant(s) Notes Reviewed:      Care Discussed with Consultants/Other Providers: Patient is a 77y old  Male who presents with a chief complaint of syncope (01 Mar 2018 08:30)      SUBJECTIVE / OVERNIGHT EVENTS:  Patient seen and examined at bedside. No acute events overnight. Patient states that he feels alright. Tolerated his food without any N or V. Patient did not have a BM in two days. Denies CP, SOB and endorses minimal abd pain.     MEDICATIONS  (STANDING):  diltiazem    Tablet 30 milliGRAM(s) Oral every 6 hours  gabapentin 300 milliGRAM(s) Oral at bedtime  heparin  Injectable 5000 Unit(s) SubCutaneous every 8 hours  metoprolol     tartrate 25 milliGRAM(s) Oral two times a day  piperacillin/tazobactam IVPB. 3.375 Gram(s) IV Intermittent every 8 hours  rivaroxaban 15 milliGRAM(s) Oral every 24 hours  simvastatin 10 milliGRAM(s) Oral at bedtime  sodium chloride 0.9%. 1000 milliLiter(s) (100 mL/Hr) IV Continuous <Continuous>  tamsulosin 0.4 milliGRAM(s) Oral at bedtime    MEDICATIONS  (PRN):  acetaminophen   Tablet. 650 milliGRAM(s) Oral every 6 hours PRN mild and moderate pain    tele: HRs in 50s & 60s overnight with multiple PVCs.  Vital Signs Last 24 Hrs  T(C): 37.4 (01 Mar 2018 05:05), Max: 37.6 (2018 12:56)  T(F): 99.3 (01 Mar 2018 05:05), Max: 99.6 (2018 12:56)  HR: 62 (01 Mar 2018 05:05) (56 - 70)  BP: 133/60 (01 Mar 2018 05:05) (131/63 - 152/75)  BP(mean): --  RR: 17 (01 Mar 2018 05:05) (16 - 17)  SpO2: 97% (01 Mar 2018 05:05) (97% - 100%)  CAPILLARY BLOOD GLUCOSE        I&O's Summary    2018 07:01  -  01 Mar 2018 07:00  --------------------------------------------------------  IN: 350 mL / OUT: 1100 mL / NET: -750 mL    01 Mar 2018 07:01  -  01 Mar 2018 11:27  --------------------------------------------------------  IN: 0 mL / OUT: 600 mL / NET: -600 mL      PHYSICAL EXAM:  GENERAL: NAD, well-developed  HEAD:  Atraumatic, Normocephalic  EYES: EOMI,  conjunctiva and sclera clear  NECK: Supple  CHEST/LUNG: Clear to auscultation bilaterally; No wheeze  HEART: Regular rate and rhythm; 2/6 systolic murmurs,  no rubs, or gallops  ABDOMEN: Soft, mild LLQ tenderness , Nondistended; Bowel sounds present  EXTREMITIES:  No clubbing, cyanosis, or edema  PSYCH: AAOx3  NEUROLOGY: non-focal  SKIN: No rashes or lesions  LABS:                        11.6   6.01  )-----------( 157      ( 01 Mar 2018 06:00 )             35.4     WBC Trend: 6.01<--, 5.68<--, 8.64<--  03-01    139  |  104  |  7   ----------------------------<  93  3.9   |  25  |  1.47<H>    Ca    8.9      01 Mar 2018 06:00  Phos  2.8     03-01  Mg     1.7     03-01      Creatinine Trend: 1.47<--, 1.54<--, 1.78<--, 2.93<--, 3.75<--        Urinalysis Basic - ( 2018 15:16 )    Color: PLYEL / Appearance: CLEAR / S.017 / pH: 6.0  Gluc: NEGATIVE / Ketone: NEGATIVE  / Bili: NEGATIVE / Urobili: NORMAL mg/dL   Blood: TRACE / Protein: 100 mg/dL / Nitrite: NEGATIVE   Leuk Esterase: NEGATIVE / RBC: 0-2 / WBC 0-2   Sq Epi: OCC / Non Sq Epi: x / Bacteria: x          RADIOLOGY & ADDITIONAL TESTS:    Imaging Personally Reviewed: x    Consultant(s) Notes Reviewed: x     Care Discussed with Consultants/Other Providers:

## 2018-03-01 NOTE — PROGRESS NOTE ADULT - SUBJECTIVE AND OBJECTIVE BOX
CC: F/U diverticulitis, bacteremia    Interval History/ROS: Feels better. Has cough. Denies fever, chills. Endorses constipation, but started eating solid food last night.     Allergies  No Known Allergies    ANTIMICROBIALS:  piperacillin/tazobactam IVPB. 3.375 every 8 hours    PE:    Vital Signs Last 24 Hrs  T(C): 37.4 (01 Mar 2018 05:05), Max: 37.6 (2018 12:56)  T(F): 99.3 (01 Mar 2018 05:05), Max: 99.6 (2018 12:56)  HR: 62 (01 Mar 2018 05:05) (56 - 70)  BP: 133/60 (01 Mar 2018 05:05) (131/63 - 152/75)  BP(mean): --  RR: 17 (01 Mar 2018 05:05) (16 - 17)  SpO2: 97% (01 Mar 2018 05:05) (97% - 100%)    Gen: AOx3, NAD, non-toxic, pleasant  CV: S1+S2 normal, no murmurs  Resp: Clear bilat, no resp distress  Abd: Soft, nontender, +BS  Ext: No LE edema, no wounds  : No Valdes  IV/Skin: No thrombophlebitis  Neuro: no focal deficits    LABS:                          11.6   6.01  )-----------( 157      ( 01 Mar 2018 06:00 )             35.4       03-01    139  |  104  |  7   ----------------------------<  93  3.9   |  25  |  1.47<H>    Ca    8.9      01 Mar 2018 06:00  Phos  2.8     03-01  Mg     1.7     03-01        Urinalysis Basic - ( 2018 15:16 )    Color: PLYEL / Appearance: CLEAR / S.017 / pH: 6.0  Gluc: NEGATIVE / Ketone: NEGATIVE  / Bili: NEGATIVE / Urobili: NORMAL mg/dL   Blood: TRACE / Protein: 100 mg/dL / Nitrite: NEGATIVE   Leuk Esterase: NEGATIVE / RBC: 0-2 / WBC 0-2   Sq Epi: OCC / Non Sq Epi: x / Bacteria: x      MICROBIOLOGY:  v  BLOOD VENOUS  18 --  --  --      BLOOD  18 --  --  BLOOD CULTURE PCR  Escherichia coli    RADIOLOGY:    No new images.

## 2018-03-01 NOTE — PROGRESS NOTE ADULT - ASSESSMENT
77M HTN and Atrial fibrillation s/p ablation on Xarelto, CKD stage 2, MGUS presenting after syncopal event found to have diverticulitis on CT with E dera bacteremia.  Rapid clinical improvement with IV antibiotics.  Repeat blood cxs NGTD. Today is D/#4/10 of antibiotics. Leukocytosis improved. CRYS improving.    Recommend:  - Can change to PO cipro/flagyl when patient is stable for discharge to complete 3/7/18.  - D/C planning.    Douglas Mao MD  Pager [phone]  After 5pm/weekends call [phone]

## 2018-03-01 NOTE — DISCHARGE NOTE ADULT - HOSPITAL COURSE
The patient is a 77 year-old male who presented to the ED after a syncopal event. The event was likely due to orthostasis and dehydration given the patient's decreased PO intake and hypotensive blood pressure following the event. At that time, the patient also complained of 3-4 days of gradual onset left lower quadrant abdominal pain, 6/10, sharp, non-radiant, and constant with no worsening or mitigating factors. The patient's physical exam was significant for a tympanitic, distended abdomen with left lower quadrant tenderness to palpation and no rebound tenderness or rigidity. CT abdomen and pelvis revealed acute, uncomplicated diverticulitis of the descending colon and the patient was resuscitated with 1 L isotonic fluid boluses x 3 and began on Zosyn and clear liquid diet.     The patient's hospital course was complicated by E. coli baceteremia and the patient was followed by infectious disease. Patient is now on day 4 of IV piperacillin/tazobactam and can be discharged with oral levofloxacin and metronidazole to complete a 10-day course.    Additionally, hospital course was complicated by atrial tachycardia on telemetry, which resolved upon resumption of patient's home hypertension/stephanie blocking agents as well as acute kidney injury, likely secondary to dehydration, which resolved with IV fluid support. Patient's Xarelto was initially held due to acute kidney injury, and was restarted as creatinine returned to baseline.    The patient's abdominal pain has now reduced to 3/10 in severity and is dull in quantity. His physical exam reveals resolving left lower quadrant abdominal tenderness. He is able to tolerate a soft diet. The patient is a 77 year-old male who presented to the ED after a syncopal event. The event was likely due to orthostasis and dehydration given the patient's decreased PO intake and hypotensive blood pressure following the event. At that time, the patient also complained of 3-4 days of gradual onset left lower quadrant abdominal pain, 6/10, sharp, non-radiant, and constant with no worsening or mitigating factors. The patient's physical exam was significant for a tympanitic, distended abdomen with left lower quadrant tenderness to palpation and no rebound tenderness or rigidity. CT abdomen and pelvis revealed acute, uncomplicated diverticulitis of the descending colon and the patient was resuscitated with 1 L isotonic fluid boluses x 3 and began on Zosyn and clear liquid diet.     The patient's hospital course was complicated by E. coli baceteremia and the patient was followed by infectious disease. Patient is now on day 4 of IV piperacillin/tazobactam and can be discharged with oral levofloxacin and metronidazole to complete a 10-day course.    Additionally, hospital course was complicated by atrial tachycardia on telemetry, which resolved upon resumption of patient's home hypertension/stephanie blocking agents as well as acute kidney injury, likely secondary to dehydration, which resolved with IV fluid support. Patient's Xarelto was initially held due to acute kidney injury, and was restarted as creatinine returned to baseline.    The patient's abdominal pain has now reduced to 3/10 in severity and is dull in quantity. His physical exam reveals resolving left lower quadrant abdominal tenderness. He is able to tolerate a soft diet. Patient did not exhibit any syncopal events during hospitalization. The patient is a 77 year-old male who presented to the ED after a syncopal event. The event was likely due to orthostasis and dehydration given the patient's decreased PO intake and hypotensive blood pressure following the event. At that time, the patient also complained of 3-4 days of gradual onset left lower quadrant abdominal pain, 6/10, sharp, non-radiant, and constant with no worsening or mitigating factors. The patient's physical exam was significant for a tympanitic, distended abdomen with left lower quadrant tenderness to palpation and no rebound tenderness or rigidity. CT abdomen and pelvis revealed acute, uncomplicated diverticulitis of the descending colon and the patient was resuscitated with 1 L isotonic fluid boluses x 3 and began on Zosyn and clear liquid diet. CT also revealed cholelithiasis, and was followed with abdominal ultrasound revealing 1.5 cm gallstone with no signs of acute cholecystitis.    The patient's hospital course was complicated by E. coli baceteremia and the patient was followed by infectious disease. Patient is now on day 4 of IV piperacillin/tazobactam and can be discharged with oral levofloxacin and metronidazole to complete a 10-day course.    Additionally, hospital course was complicated by atrial tachycardia on telemetry, which resolved upon resumption of patient's home hypertension/stephanie blocking agents as well as acute kidney injury, likely secondary to dehydration, which resolved with IV fluid support. Renal ultrasound revealed no hydronephrosis. Patient's Xarelto was initially held due to acute kidney injury, and was restarted as creatinine returned to baseline.    The patient's abdominal pain has now reduced to 3/10 in severity and is dull in quantity. His physical exam reveals resolving left lower quadrant abdominal tenderness. He is able to tolerate a soft diet. Patient did not exhibit any syncopal events during hospitalization. The patient is a 77 year-old male with past medical history of hypertension and atrial fibrillation on Xarelto status-post ablation, who presented to the ED after a syncopal event. The event was likely due to orthostasis and dehydration given the patient's decreased PO intake and hypotensive blood pressure following the event. At that time, the patient also complained of 3-4 days of gradual onset left lower quadrant abdominal pain, 6/10, sharp, non-radiant, and constant with no worsening or mitigating factors. The patient's physical exam was significant for a tympanitic, distended abdomen with left lower quadrant tenderness to palpation and no rebound tenderness or rigidity. CT abdomen and pelvis revealed acute, uncomplicated diverticulitis of the descending colon and the patient was resuscitated with 1 L isotonic fluid boluses x 3 and began on Zosyn and clear liquid diet. CT also revealed cholelithiasis, and was followed with abdominal ultrasound revealing 1.5 cm gallstone with no signs of acute cholecystitis.    The patient's hospital course was complicated by E. coli baceteremia and the patient was followed by infectious disease. Patient is now on day 4 of IV piperacillin/tazobactam and can be discharged with oral levofloxacin and metronidazole to complete a 10-day course.    Additionally, hospital course was complicated by atrial tachycardia on telemetry, which resolved upon resumption of patient's home hypertension/stephanie blocking agents as well as acute kidney injury, likely secondary to dehydration, which resolved with IV fluid support. Renal ultrasound revealed no hydronephrosis. Patient's Xarelto was initially held due to acute kidney injury, and was restarted as creatinine returned to baseline.    The patient's abdominal pain has now reduced to 3/10 in severity and is dull in quantity. His physical exam reveals resolving left lower quadrant abdominal tenderness. He is able to tolerate a soft diet. Patient did not exhibit any syncopal events during hospitalization. The patient is a 77 year-old male with past medical history of hypertension and atrial fibrillation on Xarelto, who presented to the ED after a syncopal event, or episode of "fainting". The event was likely due to dehydration. At that time, the patient also complained of 3-4 days of lower left-sided abdominal pain. The patient's physical exam showed that the abdomen was distended and tender in the lower-left region, without warning signs. A CT scan of the abdomen showed an infection of the left side of the large intestine. The patient was given IV fluids and started on antibiotics. His diet was maintained on liquids.    The patient's hospital course was complicated by a bacterial infection of the blood and the infectious disease specialists followed the patient's case. There are no more detectable bacteria in the blood but the patient must continue a 10-day course of oral antibiotics upon discharge. The hospital course was also complicated by decreased kidney function, which can be common in cases of dehydration, so kidney function was monitored by daily labs and adjustment of medication doses.    The patient's hypertension was managed via his home medications (diltiazem and metoprolol) and his atrial fibrillation was managed by monitoring his heart rate on telemetry and keeping his blood thin via Xarelto, to prevent a blood clot.    The patient's abdominal pain has now reduced significantly and his physical exam is normal. He is able to tolerate a soft diet. The patient is a 77 year-old male with past medical history of hypertension and atrial fibrillation on Xarelto, who presented to the ED after a syncopal event. The event was likely due to poor oral intake. At that time, the patient also complained of 3-4 days of lower left-sided abdominal pain. The patient's physical exam showed that the abdomen was distended and tender in the lower-left region, without warning signs. A CT scan of the abdomen showed acute uncomplicated diverticulitis of the descending colon. The patient was given IV fluids and started on antibiotics (Zosyn). His diet was maintained on liquids and subsequently advanced to regular diet.    The patient's hospital course was complicated by a E.coli bacteremia and ID followed the patient's case. Recent blood cultures were negative, pt to continue a 10-day course of oral antibiotics (Cipro/flagyl)  upon discharge until March 7. 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 The patient's hypertension was managed via his home medications (diltiazem and metoprolol) and his atrial fibrillation was managed by monitoring his heart rate on telemetry and keeping his blood thin via Xarelto, to prevent a blood clot. The patient's abdominal pain has now reduced significantly and his physical exam is normal. He is able to tolerate a soft diet. Patient is now stable for discharge. The patient is a 77 year-old male with past medical history of hypertension and atrial fibrillation on Xarelto, who presented to the ED after a syncopal event. The event was likely due to poor oral intake. At that time, the patient also complained of 3-4 days of lower left-sided abdominal pain. The patient's physical exam showed that the abdomen was distended and tender in the lower-left region, without warning signs. A CT scan of the abdomen showed acute uncomplicated diverticulitis of the descending colon. The patient was given IV fluids and started on antibiotics (Zosyn). His diet was maintained on liquids and subsequently advanced to regular diet.    The patient found to have a E.coli bacteremia and ID followed the patient's case. Recent blood cultures were negative, pt to continue a 10-day course of oral antibiotics (Cipro/flagyl)  upon discharge until March 7. 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 The patient's hypertension was managed via his home medications (diltiazem and metoprolol) and his atrial fibrillation was managed by monitoring his heart rate on telemetry and keeping his blood thin via Xarelto, to prevent a blood clot. The patient's abdominal pain has now reduced significantly and his physical exam is normal. He is able to tolerate a soft diet. Patient is now stable for discharge.   Pt admitted with severe sepsis due to E.coli bacteremia/Colitis with CRYS. Much improved with IV abx and supportive care. Medically stable for discharge on oral abx.

## 2018-03-01 NOTE — DISCHARGE NOTE ADULT - MEDICATION SUMMARY - MEDICATIONS TO TAKE
I will START or STAY ON the medications listed below when I get home from the hospital:    Flagyl 500 mg oral tablet  -- 1 tab(s) by mouth every 8 hours.  -- Do not drink alcoholic beverages when taking this medication.  Finish all this medication unless otherwise directed by prescriber.  May discolor urine or feces.    -- Indication: For infection    lisinopril 2.5 mg oral tablet  -- 1 tab(s) by mouth once a day  -- Indication: For Hypertension    tamsulosin 0.4 mg oral capsule  -- 1 cap(s) by mouth once a day  -- Indication: For BPH    dilTIAZem 120 mg/24 hours oral tablet, extended release  -- 1 tab(s) by mouth once a day  -- Indication: For Hypertension    rivaroxaban 15 mg oral tablet  -- 1 tab(s) by mouth every 24 hours  -- Indication: For Atrial fibrillation    gabapentin 300 mg oral capsule  -- 1 cap(s) by mouth once a day (at bedtime)  -- Indication: For Pain    simvastatin 10 mg oral tablet  -- 1 tab(s) by mouth once a day (at bedtime)  -- Indication: For Hyperlipidemia    Metoprolol Tartrate 50 mg oral tablet  -- 1 tab(s) by mouth 2 times a day  -- Indication: For Hypertension    Ferrex-150 oral capsule  -- 1 cap(s) by mouth 2 times a day  -- Indication: For iron deficiency     docusate sodium 50 mg oral capsule  -- 1 cap(s) by mouth 2 times a day  -- Indication: For Constipation    Senna 8.6 mg oral tablet  -- 1 tab(s) by mouth once a day (at bedtime)  -- Indication: For Constipation    Cipro 500 mg oral tablet  -- 1 tab(s) by mouth 2 times a week every 12 hours.  -- Avoid prolonged or excessive exposure to direct and/or artificial sunlight while taking this medication.  Check with your doctor before becoming pregnant.  Do not take dairy products, antacids, or iron preparations within one hour of this medication.  Finish all this medication unless otherwise directed by prescriber.  Medication should be taken with plenty of water.    -- Indication: For infection

## 2018-03-01 NOTE — DISCHARGE NOTE ADULT - MEDICATION SUMMARY - MEDICATIONS TO CHANGE
I will SWITCH the dose or number of times a day I take the medications listed below when I get home from the hospital:    Xarelto 20 mg oral tablet  -- 1 tab(s) by mouth once a day (in the evening)

## 2018-03-01 NOTE — PROGRESS NOTE ADULT - PROBLEM SELECTOR PLAN 4
-resolved  - in the setting of poor PO intake and dehydration  -No further episodes   - s/p 3L IVF boluses and IVF

## 2018-03-01 NOTE — PROGRESS NOTE ADULT - PROBLEM SELECTOR PLAN 5
-[id number]: 3  -patient s/p catheter ablation and on Xarelto at home  -Xarelto at 15mg daily (CrCl at 41, can resume original dose when CrCl >50)  -Cardizem at 30mg q6hrs  -Metoprolol 25mg BID

## 2018-03-01 NOTE — DISCHARGE NOTE ADULT - PATIENT PORTAL LINK FT
You can access the [facility], offered by [facility], by registering with the following website: [email or web address]

## 2018-03-01 NOTE — PROGRESS NOTE ADULT - PROBLEM SELECTOR PLAN 3
-CRYS on CKD stage 2-3a per PCP records, improving Cr today 1.54  - CRYS likely in the setting of hypotension   -no evidence of hydro on renal u/s -CRYS on CKD stage 2-3a per PCP records, improving Cr today 1.47  - CRYS likely in the setting of hypotension   -no evidence of hydro on renal u/s

## 2018-03-01 NOTE — PROGRESS NOTE ADULT - PROBLEM SELECTOR PLAN 6
- per U/S Partially contracted gallbladder containing 1.5 cm nonmobile gallstone at   the gallbladder neck.  No evidence of acute cholecystitis.  -Continue to monitor, pt asymptomatic -stable  - per U/S Partially contracted gallbladder containing 1.5 cm nonmobile gallstone at   the gallbladder neck.  No evidence of acute cholecystitis.

## 2018-03-01 NOTE — PROGRESS NOTE ADULT - PROBLEM SELECTOR PLAN 2
-Continue pip/tazo day 3  -Pt with E.coli bacteremia   -s/p IVF hydration w/NS @ 100cc for 24hrs  -advanced diet to soft -Continue pip/tazo day 3  -Pt with E.coli bacteremia   -s/p IVF hydration w/NS @ 100cc for 24hrs  -advanced diet regular

## 2018-03-01 NOTE — PROGRESS NOTE ADULT - PROBLEM SELECTOR PLAN 3
-acute uncomplicated diverticulitis found on abdominal CT (non-contrast)  -LLQ tenderness improving  -s/p 6 doses of Zosyn -acute uncomplicated diverticulitis found on abdominal CT (non-contrast)  -LLQ tenderness improving  -day 4 of Zosyn

## 2018-03-01 NOTE — DISCHARGE NOTE ADULT - OTHER SIGNIFICANT FINDINGS
CT Abdomen and Pelvis Non-Contrast (2/26): Acute uncomplicated diverticulitis of the descending colon. Cholelithiasis.    CT Head Non-Contrast (2/26):   No acute intracranial bleeding, mass effect, or shift. Mild chronic   microvascular ischemic changes.    Abdominal Ultrasound (2/26): Partially contracted gallbladder containing 1.5 cm nonmobile gallstone at the gallbladder neck.  No evidence of acute cholecystitis.    Renal Ultrasound (2/26): No hydronephrosis.

## 2018-03-01 NOTE — PHYSICAL THERAPY INITIAL EVALUATION ADULT - PERTINENT HX OF CURRENT PROBLEM, REHAB EVAL
77 year-old male w/ PMH of HTN and Atrial fibrillation s/p ablation on Xarelto presenting after syncopal event.

## 2018-03-01 NOTE — DISCHARGE NOTE ADULT - INSTRUCTIONS
Advance diet from soft to normal solid food slowly over the course of the next 2-3 days. Transition diet from soft to regular diet slowly over the course of the next 2-3 days.

## 2018-03-01 NOTE — PROGRESS NOTE ADULT - SUBJECTIVE AND OBJECTIVE BOX
Patient interviewed at bedside. Patient states that abdominal pain remains in the LLQ and is decreasing in severity, now 4/10, non-radiating. Patient has not eaten since yesterday given diarrhea. He denies nausea or vomiting. Per tele tech, patient bradycardic overnight, primarily in 502 and 60s.    Physical Exam:  Vitals: Tmax 37.8, HR 70, /57, RR 18/98% on RA    General: patient lying in bed; well-appearing; no acute distress.  Mucosa: no mucosal pallor  Skin: no tenting of skin  Cardiac: holosystolic murmur at apex 3/6 grade, regular rate and rhythm  Respiratory: CTAB  Abdomen: soft, distended, tympanitic, normal bowel sounds in all quadrants, mild tender to palpation in LLQ,  Extremities: no edema or cyanosis  Vascular: 2+ pulses in radial, DP, PT bilaterally    Results:  CBC: WBC 5.68, H/H 11/34, Plt 145  BMP: Na 140, K 4.3, Cl 107, CO2 22, BUN 12, Cr 1.54, Glucose 91  Ca 8.7, Mg 1.7, P 2.4    Urine Na 121, Urine Cr 84.27, Urine Osm 515    Calculated FeNa: 1.6%  Calcualted CrCl: 41 mL/min    Blood Culture: E coli Patient interviewed at bedside. Patient states that abdominal pain in the LLQ is decreasing in severity, now 3/10, non-radiating, dull. Patient ate string beans for dinner without nausea, vomiting, or diarrhea. He endorses nasal congestion with blood upon blowing his nose. Denies chest pain, lightheadedness, dyspnea. Per tele tech, HRs in 50s & 60s overnight with multiple PVCs.    Physical Exam:  Vitals: T 37.4, HR 62, /60, RR 17/97% on RA    General: patient lying in bed; well-appearing; no acute distress.  Mucosa: no mucosal pallor  Skin: no tenting of skin  Cardiac: holosystolic murmur at apex 3/6 grade, regular rate and rhythm  Respiratory: CTAB  Abdomen: soft, distended, tympanitic, normal bowel sounds in all quadrants, mildly tender to deep palpation in LLQ,  Extremities: no edema or cyanosis  Vascular: 2+ pulses in radial, DP, PT bilaterally    Results:  CBC: WBC 6.01, H/H 11.6/35.4, Plt 157  BMP: Pending    Blood Culture: E coli Patient interviewed at bedside. Patient states that abdominal pain in the LLQ is decreasing in severity, now 3/10, non-radiating, dull. Patient ate string beans for dinner without nausea, vomiting, or diarrhea. He endorses nasal congestion with blood upon blowing his nose. Denies chest pain, lightheadedness, dyspnea. Per tele tech, HRs in 50s & 60s overnight with multiple PVCs.    Physical Exam:  Vitals: T 37.4, HR 62, /60, RR 17/97% on RA    General: patient lying in bed; well-appearing; no acute distress.  Mucosa: no mucosal pallor  Skin: no tenting of skin  Cardiac: holosystolic murmur at apex 3/6 grade, regular rate and rhythm  Respiratory: CTAB  Abdomen: soft, distended, tympanitic, normal bowel sounds in all quadrants, mildly tender to deep palpation in LLQ,  Extremities: no edema or cyanosis  Vascular: 2+ pulses in radial, DP, PT bilaterally    Results:  CBC: WBC 6.01, H/H 11.6/35.4, Plt 157  BMP: Na 139, K 3.9, Cl 104, CO2 25, BUN 7, cr 1.47, Glucose 93  Ca 8.9, Mg 1.7, P 2.8    Blood Culture: negative

## 2018-03-01 NOTE — DISCHARGE NOTE ADULT - CARE PLAN
Goal:	Resolve bacteremia  Assessment and plan of treatment:	Patient's blood cultures revealed E. coli bacteremia upon presentation. Blood cultures are now unremarkable and patient is on day 4 of IV Zosyn treatment.  Patient should complete a 10-day course of oral levofloxacin (daily) and oral metronidazole (every 6-8 hours).  Goal:	Resolving acute diverticulitis  Assessment and plan of treatment:	Patient initially presented with left-lower quadrant tenderness and CT findings revealing acute, uncomplicated diverticulitis. Patient completed 4 days of IV Zosyn treatment and tenderness has resolved.    Patient should continue to advance diet from soft foods to regular diet over the course of 2-3 days.  Goal:	Syncope prevention  Assessment and plan of treatment:	Patient's syncopal event likely secondary to orthostasis.  Patient should remain hydrated and well-nourished. Goal:	Resolve bacteremia  Assessment and plan of treatment:	Patient's blood cultures revealed E. coli bacteremia upon presentation. Blood cultures are now unremarkable and patient is on day 4 of IV Zosyn treatment.  Patient should complete a 10-day course of oral levofloxacin (daily) and oral metronidazole (every 6-8 hours).  Goal:	Resolving acute diverticulitis  Assessment and plan of treatment:	Patient initially presented with left-lower quadrant tenderness and CT findings revealing acute, uncomplicated diverticulitis. Patient completed 4 days of IV Zosyn treatment and tenderness has resolved.    Patient should continue to advance diet from soft foods to regular diet over the course of 2-3 days.  Goal:	Syncope prevention  Assessment and plan of treatment:	Patient's syncopal event likely secondary to orthostasis.  Patient should remain hydrated and well-nourished.  Goal:	Atrial Fibrillation  Assessment and plan of treatment:	Patient has [id number] score of 3 on Xarelto and status-post ablation. Patient has been resumed on Xarelto at 15mg. Patient rate controlled and should continue diltiazem at 30 mg every 6 hours and metoprolol 25 mg twice a day.  Goal:	Hypertension  Assessment and plan of treatment:	Blood pressure has been well-controlled on diltiazem 30 mg every 6 hours and metoprolol 25 mg twice a day. Continue regimen at home.  Goal:	Cholelithiasis  Assessment and plan of treatment:	Patient found to have 1.5cm gallstone on abdominal ultrasound with no signs of acute cholecystitis. No follow-up required. Goal:	Resolve bacteremia  Assessment and plan of treatment:	Patient's blood cultures revealed E. coli bacteremia upon presentation. Blood cultures are now unremarkable and patient is on day 4 of IV Zosyn treatment.  Patient should complete a 10-day course of oral ciprofloxacin (twice a day) and oral metronidazole (every 6-8 hours).  Goal:	Resolving acute diverticulitis  Assessment and plan of treatment:	Patient initially presented with left-lower quadrant tenderness and CT findings revealing acute, uncomplicated diverticulitis. Patient completed 4 days of IV Zosyn treatment and tenderness has resolved.    Patient should continue to advance diet from soft foods to regular diet over the course of 2-3 days.  Goal:	Syncope prevention  Assessment and plan of treatment:	Patient's syncopal event likely secondary to orthostasis.  Patient should remain hydrated and well-nourished.  Goal:	Atrial Fibrillation  Assessment and plan of treatment:	Patient has [id number] score of 3 on Xarelto and status-post ablation. Patient has been resumed on Xarelto at 15mg. Patient rate controlled and should continue diltiazem at 30 mg every 6 hours and metoprolol 25 mg twice a day.  Goal:	Hypertension  Assessment and plan of treatment:	Blood pressure has been well-controlled on diltiazem 30 mg every 6 hours and metoprolol 25 mg twice a day. Continue regimen at home.  Goal:	Cholelithiasis  Assessment and plan of treatment:	Patient found to have 1.5cm gallstone on abdominal ultrasound with no signs of acute cholecystitis. No follow-up required. Principal Discharge DX:	Septicemia  Goal:	Resolve bacteremia  Assessment and plan of treatment:	Patient's blood cultures revealed E. coli bacteremia upon presentation. Blood cultures are now unremarkable and patient is on day 4 of IV Zosyn treatment.  Patient should complete a 10-day course of oral ciprofloxacin (twice a day) and oral metronidazole (every 6-8 hours).  Secondary Diagnosis:	Diverticulitis  Goal:	Resolving acute diverticulitis  Assessment and plan of treatment:	Patient initially presented with left-lower quadrant tenderness and CT findings revealing acute, uncomplicated diverticulitis. Patient completed 4 days of IV Zosyn treatment and tenderness has resolved.    Patient should continue to advance diet from soft foods to regular diet over the course of 2-3 days.  Secondary Diagnosis:	Syncope  Goal:	Syncope prevention  Assessment and plan of treatment:	Patient's syncopal event likely secondary to orthostasis.  Patient should remain hydrated and well-nourished.  Secondary Diagnosis:	Atrial fibrillation  Goal:	Achieve adequate anticoagulation and maintain heart rates under 100  Assessment and plan of treatment:	Patient has [id number] score of 3 on Xarelto and status-post ablation. Patient has been resumed on Xarelto at 15mg. Patient rate controlled and should continue diltiazem at 30 mg every 6 hours and metoprolol 25 mg twice a day.  Secondary Diagnosis:	Hypertension  Goal:	Maintain control  Assessment and plan of treatment:	Blood pressure has been well-controlled on diltiazem 30 mg every 6 hours and metoprolol 25 mg twice a day. Continue regimen at home.  Secondary Diagnosis:	Cholelithiasis  Goal:	Monitor  Assessment and plan of treatment:	Patient found to have 1.5cm gallstone on abdominal ultrasound with no signs of acute cholecystitis. No follow-up required. Principal Discharge DX:	Septicemia  Goal:	Resolve bacteremia  Assessment and plan of treatment:	Patient's blood cultures revealed E. coli bacteremia upon presentation. Blood cultures are now unremarkable and patient is on day 4 of IV Zosyn treatment.  Patient should complete a 10-day course of oral ciprofloxacin (twice a day) and oral metronidazole (every 6-8 hours).  Secondary Diagnosis:	Diverticulitis  Goal:	Resolving acute diverticulitis  Assessment and plan of treatment:	Patient initially presented with left-lower quadrant tenderness and CT findings revealing acute, uncomplicated diverticulitis. Patient completed 4 days of IV Zosyn treatment and tenderness has resolved.    Patient should continue to advance diet from soft foods to regular diet over the course of 2-3 days.  Secondary Diagnosis:	Syncope  Goal:	Prevention  Assessment and plan of treatment:	Patient's syncopal event likely secondary to orthostasis.  Patient should remain hydrated and well-nourished.  Secondary Diagnosis:	Atrial fibrillation  Goal:	Achieve adequate anticoagulation and maintain heart rates under 110  Assessment and plan of treatment:	Patient has [id number] score of 3 on Xarelto and status-post ablation. Patient has been resumed on Xarelto at 15mg. Patient rate controlled and should continue diltiazem at 30 mg every 6 hours and metoprolol 25 mg twice a day.  Secondary Diagnosis:	Hypertension  Goal:	Maintain control  Assessment and plan of treatment:	Blood pressure has been well-controlled on diltiazem 30 mg every 6 hours and metoprolol 25 mg twice a day. Continue regimen at home.  Secondary Diagnosis:	Cholelithiasis  Goal:	Monitor  Assessment and plan of treatment:	Patient found to have 1.5cm gallstone on abdominal ultrasound with no signs of acute cholecystitis. No follow-up required. Principal Discharge DX:	Septicemia  Goal:	Remove infection from blood  Assessment and plan of treatment:	Bacteria was found in the patient's blood which was then treated with 4 days of antibiotics.  Patient should complete 10 days of oral antibiotics. The antibiotics prescribed are ciprofloxacin, taken twice a day, and metronidazole taken every 6-8 hours.  Secondary Diagnosis:	Diverticulitis  Goal:	Remove infection of intestine  Assessment and plan of treatment:	The patient was found to have infection of the intestine on a CT scan of the abdomen which was then treated with 4 days of antibiotics. The infection resolved by the decrease in abdominal pain. At home, the patient should continue to transition his diet from soft foods to regular diet over the course of 2-3 days.  Secondary Diagnosis:	Syncope  Goal:	Prevent another "fainting" episode  Assessment and plan of treatment:	The patient's syncopal event, or "fainting" was likely due to dehydration.  Patient should remain hydrated and well-nourished and be sure to gradually switch between lying down, sitting upright, and standing up.  Secondary Diagnosis:	Atrial fibrillation  Goal:	Continue blood thinner medication and keep heart rate within a normal range  Assessment and plan of treatment:	The patient's atrial fibrillation was controlled in the hospital with blood pressure medications (diltiazem and metoprolol) to keep the heart rate in normal limits. Xarelto was used for blood thinning to prevent formation of a blood clot.  Secondary Diagnosis:	Hypertension  Goal:	Keep the blood pressure within a normal range  Assessment and plan of treatment:	The patient's blood pressure has been well-controlled with his home medications (diltiazem and metoprolol). These medications should be continued at home  Secondary Diagnosis:	Cholelithiasis  Goal:	Monitor and add to health records  Assessment and plan of treatment:	The patient found to have 1.5cm gallstone on an abdominal ultrasound. There is no sign of an acute disease and no follow-up is required except to mention this new medical information to your primary care provider. Principal Discharge DX:	Septicemia  Goal:	Remove infection from blood  Assessment and plan of treatment:	Bacteria was found in the patient's blood which was then treated with 4 days of antibiotics.  Patient should complete 10 days of oral antibiotics. The antibiotics prescribed are ciprofloxacin, taken twice a day, and metronidazole taken every 6-8 hours. Please follow up with your primary care doctor within a week of discharge.  Secondary Diagnosis:	Diverticulitis  Goal:	Remove infection of intestine  Assessment and plan of treatment:	The patient was found to have infection of the intestine on a CT scan of the abdomen which was then treated with 4 days of antibiotics. The infection resolved by the decrease in abdominal pain. At home, the patient should continue to transition his diet from soft foods to regular diet over the course of 2-3 days.  Please follow up with your primary care doctor within a week of discharge.  Secondary Diagnosis:	Syncope  Goal:	Prevent another "fainting" episode  Assessment and plan of treatment:	The patient's syncopal event, or "fainting" was likely due to dehydration.  Patient should remain hydrated and well-nourished and be sure to gradually switch between lying down, sitting upright, and standing up.  Please follow up with your primary care doctor within a week of discharge.  Secondary Diagnosis:	Atrial fibrillation  Goal:	Continue blood thinner medication and keep heart rate within a normal range  Assessment and plan of treatment:	The patient's atrial fibrillation was controlled in the hospital with blood pressure medications (diltiazem and metoprolol) to keep the heart rate in normal limits. Xarelto was used for blood thinning to prevent formation of a blood clot.  Secondary Diagnosis:	Hypertension  Goal:	Keep the blood pressure within a normal range  Assessment and plan of treatment:	The patient's blood pressure has been well-controlled with his home medications (diltiazem and metoprolol). These medications should be continued at home.  Secondary Diagnosis:	Cholelithiasis  Goal:	Monitor and add to health records  Assessment and plan of treatment:	The patient found to have 1.5cm gallstone on an abdominal ultrasound. There is no sign of an acute disease and no follow-up is required except to mention this new medical information to your primary care provider.

## 2018-03-01 NOTE — PROGRESS NOTE ADULT - PROBLEM SELECTOR PLAN 1
-Blood cultures revealed E coli  -s/p 6doses of Zosyn, continue q8hrs  -follow-up ID recs  -repeat blood cultures -Blood cultures revealed E coli  -Day 4 of Zosyn  -ID recommends changing to PO Levaquin/Flagyl for discharge and complete 10-day course  -repeat blood cultures -Blood cultures revealed E coli  -Day 4 of Zosyn  -ID recommends changing to PO Levaquin/Flagyl for discharge and complete 10-day course  --Flagyl 500mg q6-8hrs  --Levaquin 500mg daily  -repeat blood cultures

## 2018-03-01 NOTE — DISCHARGE NOTE ADULT - ADDITIONAL INSTRUCTIONS
Patient should follow-up with primary care provider in 1 week with discharge paperwork. Patient should follow-up with primary care provider in 1 week with discharge paperwork. Return to Emergency Department for fevers, worsening abdominal pain, vomiting, bloody stool. Patient should follow-up with primary care provider in 1 week with discharge paperwork for evaluation of progress and adjustment of medication dosing. Return to Emergency Department for fevers, worsening abdominal pain, vomiting, bloody stool.

## 2018-03-01 NOTE — DISCHARGE NOTE ADULT - PROVIDER TOKENS
FREE:[LAST:[Saroj],FIRST:[Dinh],PHONE:[phone]],FAX:[phone]    -],ADDRESS:[[phone] [place] [address]]]

## 2018-03-01 NOTE — PROGRESS NOTE ADULT - ATTENDING COMMENTS
ID input appreciated.  Will obtain PT eval, advance diet and d/c planning for tomorrow.  Spoke to Daughter Yary (RN) on the phone to give her update and f/u issues. She understood and agreed with the plan.

## 2018-03-01 NOTE — PROGRESS NOTE ADULT - PROBLEM SELECTOR PLAN 5
-[id number] score of 3  -on xarelto at home, will continue renally dosed  -Pt tachycardic, restarted Cardizem 30mg Q6hr and metoprolol 25BID; will adjust as needed

## 2018-03-01 NOTE — PROGRESS NOTE ADULT - PROBLEM SELECTOR PLAN 1
-improving  -Pt with E.coli bacteremia in the setting of diverticulitis, coming in with hypotension and recent episodes of tachycardia  -No longer tachycardic  -Today Day 3 of piptazo  -Blood cultures reordered pending -resolved  -Pt with E.coli bacteremia in the setting of diverticulitis, coming in with hypotension and recent episodes of tachycardia  -No longer tachycardic  -Today Day 4 of piptazo  -Blood cultures reordered pending

## 2018-03-02 VITALS — HEART RATE: 64 BPM | DIASTOLIC BLOOD PRESSURE: 71 MMHG | SYSTOLIC BLOOD PRESSURE: 137 MMHG

## 2018-03-02 LAB
BUN SERPL-MCNC: 12 MG/DL — SIGNIFICANT CHANGE UP (ref 7–23)
CALCIUM SERPL-MCNC: 8.9 MG/DL — SIGNIFICANT CHANGE UP (ref 8.4–10.5)
CHLORIDE SERPL-SCNC: 105 MMOL/L — SIGNIFICANT CHANGE UP (ref 98–107)
CO2 SERPL-SCNC: 25 MMOL/L — SIGNIFICANT CHANGE UP (ref 22–31)
CREAT SERPL-MCNC: 1.49 MG/DL — HIGH (ref 0.5–1.3)
GLUCOSE SERPL-MCNC: 93 MG/DL — SIGNIFICANT CHANGE UP (ref 70–99)
MAGNESIUM SERPL-MCNC: 1.6 MG/DL — SIGNIFICANT CHANGE UP (ref 1.6–2.6)
PHOSPHATE SERPL-MCNC: 3.1 MG/DL — SIGNIFICANT CHANGE UP (ref 2.5–4.5)
POTASSIUM SERPL-MCNC: 4.2 MMOL/L — SIGNIFICANT CHANGE UP (ref 3.5–5.3)
POTASSIUM SERPL-SCNC: 4.2 MMOL/L — SIGNIFICANT CHANGE UP (ref 3.5–5.3)
SODIUM SERPL-SCNC: 141 MMOL/L — SIGNIFICANT CHANGE UP (ref 135–145)

## 2018-03-02 PROCEDURE — 99239 HOSP IP/OBS DSCHRG MGMT >30: CPT

## 2018-03-02 PROCEDURE — 99232 SBSQ HOSP IP/OBS MODERATE 35: CPT

## 2018-03-02 RX ORDER — MOXIFLOXACIN HYDROCHLORIDE TABLETS, 400 MG 400 MG/1
1 TABLET, FILM COATED ORAL
Qty: 12 | Refills: 0 | OUTPATIENT
Start: 2018-03-02 | End: 2018-03-07

## 2018-03-02 RX ORDER — MOXIFLOXACIN HYDROCHLORIDE TABLETS, 400 MG 400 MG/1
1 TABLET, FILM COATED ORAL
Qty: 2 | Refills: 0 | OUTPATIENT
Start: 2018-03-02 | End: 2018-03-07

## 2018-03-02 RX ORDER — RIVAROXABAN 15 MG-20MG
1 KIT ORAL
Qty: 30 | Refills: 0 | OUTPATIENT
Start: 2018-03-02 | End: 2018-03-31

## 2018-03-02 RX ORDER — METRONIDAZOLE 500 MG
1 TABLET ORAL
Qty: 18 | Refills: 0 | OUTPATIENT
Start: 2018-03-02 | End: 2018-03-07

## 2018-03-02 RX ORDER — RIVAROXABAN 15 MG-20MG
1 KIT ORAL
Qty: 0 | Refills: 0 | COMMUNITY

## 2018-03-02 RX ADMIN — Medication 25 MILLIGRAM(S): at 06:07

## 2018-03-02 RX ADMIN — Medication 25 MILLIGRAM(S): at 17:27

## 2018-03-02 RX ADMIN — RIVAROXABAN 15 MILLIGRAM(S): KIT at 17:27

## 2018-03-02 RX ADMIN — HEPARIN SODIUM 5000 UNIT(S): 5000 INJECTION INTRAVENOUS; SUBCUTANEOUS at 06:07

## 2018-03-02 RX ADMIN — PIPERACILLIN AND TAZOBACTAM 25 GRAM(S): 4; .5 INJECTION, POWDER, LYOPHILIZED, FOR SOLUTION INTRAVENOUS at 06:07

## 2018-03-02 NOTE — PROGRESS NOTE ADULT - PROBLEM SELECTOR PLAN 2
-Continue pip/tazo day 3  -Pt with E.coli bacteremia   -s/p IVF hydration w/NS @ 100cc for 24hrs  -advanced diet regular -Continue pip/tazo day 5, PO cipro/flagyl  for discharge to complete until 3/7/18.  -Pt with E.coli bacteremia   - diet regular

## 2018-03-02 NOTE — PROGRESS NOTE ADULT - PROBLEM SELECTOR PLAN 3
-acute uncomplicated diverticulitis found on abdominal CT (non-contrast)  -LLQ tenderness resolved  -day 5 of Zosyn

## 2018-03-02 NOTE — PROGRESS NOTE ADULT - ASSESSMENT
77 year-old male w/ PMH of HTN, MGUS, CKD giovanny 2-3a and AFib on Xarelto s/p ablation presenting for syncopal episode. On presentation patient was found to be hypotensive in the setting of LLQ pain with leukocytosis but non tachycardic and afebrile. Patient also has cholelithiasis on imaging.  Patient currently with CRYS on CKD stage 2-3a and uncomplicated diverticulitis per CT findings now on zosyn given E.coli bacteremia and Sepsis. 77 year-old male w/ PMH of HTN, MGUS, CKD giovanny 2-3a and AFib on Xarelto s/p ablation presenting for syncopal episode. On presentation patient was found to be hypotensive in the setting of LLQ pain with leukocytosis but non tachycardic and afebrile. Patient also has cholelithiasis on imaging.  Patient currently with CRYS on CKD stage 2-3a and uncomplicated diverticulitis per CT findings now on zosyn given E.coli bacteremia and Sepsis. Patient pending d/c today

## 2018-03-02 NOTE — PROGRESS NOTE ADULT - PROBLEM SELECTOR PROBLEM 8
Preventive measure
Prophylactic measure
Preventive measure
Prophylactic measure

## 2018-03-02 NOTE — PROGRESS NOTE ADULT - ASSESSMENT
77M HTN and Atrial fibrillation s/p ablation on Xarelto, CKD stage 2, MGUS presenting after syncopal event found to have diverticulitis on CT with E drea bacteremia.  Rapid clinical improvement with IV antibiotics.  Repeat blood cxs NGTD. Leukocytosis improved. CRYS improving.    Recommend:  - Can change to PO cipro/flagyl when patient is stable for discharge to complete 3/7/18.  - D/C planning.    Douglas Mao MD  Pager [phone]  After 5pm/weekends call [phone]

## 2018-03-02 NOTE — PROGRESS NOTE ADULT - PROBLEM SELECTOR PLAN 3
-CRYS on CKD stage 2-3a per PCP records, improving Cr today 1.47  - CRYS likely in the setting of hypotension   -no evidence of hydro on renal u/s -CRYS on CKD stage 2-3a per PCP records, improving Cr today 1.49  - CRYS likely in the setting of hypotension   -no evidence of hydro on renal u/s

## 2018-03-02 NOTE — PROGRESS NOTE ADULT - PROBLEM SELECTOR PLAN 7
-was hypotensive   -Now on BP meds as pressures are increasing -was hypotensive   -Now on BP meds, will d/c on home meds

## 2018-03-02 NOTE — PROGRESS NOTE ADULT - PROBLEM SELECTOR PLAN 5
-[id number] score of 3  -on xarelto at home, will continue renally dosed  -Pt tachycardic, restarted Cardizem 30mg Q6hr and metoprolol 25BID; will adjust as needed -[id number] score of 3  -on xarelto at home, will continue renally dosed and discharged on 15mg xarelto  -Pt tachycardic, restarted Cardizem 30mg Q6hr and metoprolol 25BID; will adjust as needed

## 2018-03-02 NOTE — PROGRESS NOTE ADULT - ATTENDING COMMENTS
Medically stable for discharge.  Spoke to pt at length about f/u issues. He understood and agreed with the plan.  Total time spent in coordinating discharge: 35min Pt was admitted with severe sepsis due to E. coli bacteremia/colitis, with CRYS, improving on abx.  Medically stable for discharge.  Spoke to pt at length about f/u issues. He understood and agreed with the plan.  Total time spent in coordinating discharge: 35min

## 2018-03-02 NOTE — PROGRESS NOTE ADULT - SUBJECTIVE AND OBJECTIVE BOX
Patient interviewed at bedside. Patient states that abdominal pain in the LLQ is non-existent. Patient ate multiple meals yesterday and tolerated well. Denies nausea, vomiting, or diarrhea. Last BM was on 2/28. He endorses nasal congestion with blood upon blowing his nose. Denies chest pain, lightheadedness, dyspnea. Per [employer] tech, HRs in 60s overnight.    Physical Exam:  Vitals: T 36.9, HR 69, /64, RR 18/98% on RA    General: patient lying in bed; well-appearing; no acute distress.  Mucosa: no mucosal pallor  Skin: no tenting of skin  Cardiac: holosystolic murmur at apex 3/6 grade, regular rate and rhythm  Respiratory: CTAB  Abdomen: soft, distended, tympanitic, normal bowel sounds in all quadrants, non-tender  Extremities: no edema or cyanosis  Vascular: 2+ pulses in radial, DP, PT bilaterally    Results:  BMP: Na 141, K 4.2, Cl 105, CO2 25, BUN 12, Cr 1.49, Glucose 93  Ca 8.9, Mg 1.6, P 3.1    Per Pharmacy, creatinine clearance (calculated via Cockcroft-Gault) is 46.    Blood Culture: negative

## 2018-03-02 NOTE — PROGRESS NOTE ADULT - PROBLEM SELECTOR PLAN 1
-resolved  -Pt with E.coli bacteremia in the setting of diverticulitis, coming in with hypotension and recent episodes of tachycardia  -No longer tachycardic  -Today Day 4 of piptazo  -Blood cultures reordered pending -resolved  -Pt with E.coli bacteremia in the setting of diverticulitis, coming in with hypotension and recent episodes of tachycardia  -No longer tachycardic  -Today Day 5 of piptazo, will d/c on PO cipro/flagyl  for discharge to complete 3/7/18.  -Blood cultures reordered pending

## 2018-03-02 NOTE — PROGRESS NOTE ADULT - PROBLEM SELECTOR PLAN 8
-heparin 5,000 units subQ q 8hrs for DVT ppx
DVT prophylaxis - patient on Heparin subcutaneous q8hrs
-heparin 5,000 units subQ q 8hrs for DVT ppx

## 2018-03-02 NOTE — PROGRESS NOTE ADULT - PROBLEM SELECTOR PLAN 1
-Blood cultures revealed E coli  -Day 5 of Zosyn  -ID recommends changing to PO Ciprofloxacin/Flagyl for discharge and complete course by 3/7/18  --Flagyl 500mg q6-8hrs  --Ciprofloxacin 500mg daily

## 2018-03-02 NOTE — PROGRESS NOTE ADULT - PROBLEM SELECTOR PLAN 6
-stable  - per U/S Partially contracted gallbladder containing 1.5 cm nonmobile gallstone at   the gallbladder neck.  No evidence of acute cholecystitis.

## 2018-03-02 NOTE — PROGRESS NOTE ADULT - SUBJECTIVE AND OBJECTIVE BOX
CC: F/U diverticulitis, bacteremia    Interval History/ROS: Feels better. Denies fever, chills.      Allergies  No Known Allergies    ANTIMICROBIALS:  piperacillin/tazobactam IVPB. 3.375 every 8 hours    PE:    Vital Signs Last 24 Hrs  T(C): 37.3 (02 Mar 2018 11:31), Max: 37.3 (01 Mar 2018 21:49)  T(F): 99.2 (02 Mar 2018 11:31), Max: 99.2 (02 Mar 2018 11:31)  HR: 66 (02 Mar 2018 11:31) (66 - 69)  BP: 123/64 (02 Mar 2018 11:31) (123/64 - 159/72)  BP(mean): --  RR: 17 (02 Mar 2018 11:31) (17 - 18)  SpO2: 100% (02 Mar 2018 11:31) (97% - 100%)    Gen: AOx3, NAD, non-toxic, pleasant  CV: S1+S2 normal, no murmurs  Resp: Clear bilat, no resp distress  Abd: Soft, nontender, +BS  Ext: No LE edema, no wounds  : No Valdes  IV/Skin: No thrombophlebitis  Neuro: no focal deficits    LABS:                          11.6   6.01  )-----------( 157      ( 01 Mar 2018 06:00 )             35.4       03-02    141  |  105  |  12  ----------------------------<  93  4.2   |  25  |  1.49<H>    Ca    8.9      02 Mar 2018 06:10  Phos  3.1     03-02  Mg     1.6     03-02    MICROBIOLOGY:  v  BLOOD VENOUS  02-27-18 --  --  --      BLOOD  02-26-18 --  --  BLOOD CULTURE PCR  Escherichia coli    RADIOLOGY:    No new images.

## 2018-03-02 NOTE — PROGRESS NOTE ADULT - ASSESSMENT
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 Patient on day 5 of Zosyn and tolerating PO intake.

## 2018-03-02 NOTE — PROGRESS NOTE ADULT - SUBJECTIVE AND OBJECTIVE BOX
Patient is a 77y old  Male who presents with a chief complaint of syncope (01 Mar 2018 08:30)      SUBJECTIVE / OVERNIGHT EVENTS:  Patient seen and examined at bedside. No acute events overnight.     MEDICATIONS  (STANDING):  diltiazem    Tablet 30 milliGRAM(s) Oral every 6 hours  gabapentin 300 milliGRAM(s) Oral at bedtime  heparin  Injectable 5000 Unit(s) SubCutaneous every 8 hours  metoprolol     tartrate 25 milliGRAM(s) Oral two times a day  piperacillin/tazobactam IVPB. 3.375 Gram(s) IV Intermittent every 8 hours  rivaroxaban 15 milliGRAM(s) Oral every 24 hours  simvastatin 10 milliGRAM(s) Oral at bedtime  sodium chloride 0.9%. 1000 milliLiter(s) (100 mL/Hr) IV Continuous <Continuous>  tamsulosin 0.4 milliGRAM(s) Oral at bedtime    MEDICATIONS  (PRN):  acetaminophen   Tablet. 650 milliGRAM(s) Oral every 6 hours PRN mild and moderate pain      Vital Signs Last 24 Hrs  T(C): 36.9 (02 Mar 2018 05:50), Max: 37.3 (01 Mar 2018 21:49)  T(F): 98.4 (02 Mar 2018 05:50), Max: 99.1 (01 Mar 2018 21:49)  HR: 69 (02 Mar 2018 05:50) (60 - 69)  BP: 132/64 (02 Mar 2018 05:50) (132/64 - 159/72)  BP(mean): --  RR: 18 (02 Mar 2018 05:50) (17 - 18)  SpO2: 98% (02 Mar 2018 05:50) (97% - 98%)  CAPILLARY BLOOD GLUCOSE        I&O's Summary    01 Mar 2018 07:01  -  02 Mar 2018 07:00  --------------------------------------------------------  IN: 0 mL / OUT: 1000 mL / NET: -1000 mL    02 Mar 2018 07:01  -  02 Mar 2018 09:32  --------------------------------------------------------  IN: 0 mL / OUT: 400 mL / NET: -400 mL        PHYSICAL EXAM:  GENERAL: NAD, well-developed  HEAD:  Atraumatic, Normocephalic  EYES: EOMI,  conjunctiva and sclera clear  NECK: Supple  CHEST/LUNG: Clear to auscultation bilaterally; No wheeze  HEART: Regular rate and rhythm; No murmurs, rubs, or gallops  ABDOMEN: Soft, Nontender, Nondistended; Bowel sounds present  EXTREMITIES:  No clubbing, cyanosis, or edema  PSYCH: AAOx3  NEUROLOGY: non-focal  SKIN: No rashes or lesions    LABS:                        11.6   6.01  )-----------( 157      ( 01 Mar 2018 06:00 )             35.4     WBC Trend: 6.01<--, 5.68<--, 8.64<--  03-02    141  |  105  |  12  ----------------------------<  93  4.2   |  25  |  1.49<H>    Ca    8.9      02 Mar 2018 06:10  Phos  3.1     03-02  Mg     1.6     03-02      Creatinine Trend: 1.49<--, 1.47<--, 1.54<--, 1.78<--, 2.93<--, 3.75<--              RADIOLOGY & ADDITIONAL TESTS:    Imaging Personally Reviewed:    Consultant(s) Notes Reviewed:      Care Discussed with Consultants/Other Providers: Patient is a 77y old  Male who presents with a chief complaint of syncope (01 Mar 2018 08:30)      SUBJECTIVE / OVERNIGHT EVENTS:  Patient seen and examined at bedside. No acute events overnight. Patient interviewed at bedside. Patient states that abdominal pain in the LLQ is non-existent. Patient ate multiple meals yesterday and tolerated well. Denies nausea, vomiting, or diarrhea. Last BM was on 2/28. He endorses nasal congestion with blood upon blowing his nose. Denies chest pain, lightheadedness, dyspnea. Per tele tech, HRs in 60s overnight.      MEDICATIONS  (STANDING):  diltiazem    Tablet 30 milliGRAM(s) Oral every 6 hours  gabapentin 300 milliGRAM(s) Oral at bedtime  heparin  Injectable 5000 Unit(s) SubCutaneous every 8 hours  metoprolol     tartrate 25 milliGRAM(s) Oral two times a day  piperacillin/tazobactam IVPB. 3.375 Gram(s) IV Intermittent every 8 hours  rivaroxaban 15 milliGRAM(s) Oral every 24 hours  simvastatin 10 milliGRAM(s) Oral at bedtime  sodium chloride 0.9%. 1000 milliLiter(s) (100 mL/Hr) IV Continuous <Continuous>  tamsulosin 0.4 milliGRAM(s) Oral at bedtime    MEDICATIONS  (PRN):  acetaminophen   Tablet. 650 milliGRAM(s) Oral every 6 hours PRN mild and moderate pain      Vital Signs Last 24 Hrs  T(C): 36.9 (02 Mar 2018 05:50), Max: 37.3 (01 Mar 2018 21:49)  T(F): 98.4 (02 Mar 2018 05:50), Max: 99.1 (01 Mar 2018 21:49)  HR: 69 (02 Mar 2018 05:50) (60 - 69)  BP: 132/64 (02 Mar 2018 05:50) (132/64 - 159/72)  BP(mean): --  RR: 18 (02 Mar 2018 05:50) (17 - 18)  SpO2: 98% (02 Mar 2018 05:50) (97% - 98%)  CAPILLARY BLOOD GLUCOSE        I&O's Summary    01 Mar 2018 07:01  -  02 Mar 2018 07:00  --------------------------------------------------------  IN: 0 mL / OUT: 1000 mL / NET: -1000 mL    02 Mar 2018 07:01  -  02 Mar 2018 09:32  --------------------------------------------------------  IN: 0 mL / OUT: 400 mL / NET: -400 mL        PHYSICAL EXAM:  GENERAL: NAD, well-developed  HEAD:  Atraumatic, Normocephalic  EYES: EOMI,  conjunctiva and sclera clear  NECK: Supple  CHEST/LUNG: Clear to auscultation bilaterally; No wheeze  HEART: Regular rate and rhythm; No murmurs, rubs, or gallops  ABDOMEN: Soft, Nontender, Nondistended; Bowel sounds present  EXTREMITIES:  No clubbing, cyanosis, or edema  PSYCH: AAOx3  NEUROLOGY: non-focal  SKIN: No rashes or lesions    LABS:                        11.6   6.01  )-----------( 157      ( 01 Mar 2018 06:00 )             35.4     WBC Trend: 6.01<--, 5.68<--, 8.64<--  03-02    141  |  105  |  12  ----------------------------<  93  4.2   |  25  |  1.49<H>    Ca    8.9      02 Mar 2018 06:10  Phos  3.1     03-02  Mg     1.6     03-02      Creatinine Trend: 1.49<--, 1.47<--, 1.54<--, 1.78<--, 2.93<--, 3.75<--              RADIOLOGY & ADDITIONAL TESTS:    Imaging Personally Reviewed:    Consultant(s) Notes Reviewed:      Care Discussed with Consultants/Other Providers: Patient is a 77y old  Male who presents with a chief complaint of syncope (01 Mar 2018 08:30)      SUBJECTIVE / OVERNIGHT EVENTS:  Patient seen and examined at bedside. No acute events overnight. Patient interviewed at bedside. Patient states that abdominal pain in the LLQ is non-existent. Patient ate multiple meals yesterday and tolerated well. Denies nausea, vomiting, or diarrhea; urinating well. Last BM was on 2/28, complains of constipation. He endorses nasal congestion with blood upon blowing his nose. Denies chest pain, lightheadedness, dyspnea. Per tele tech, HRs in 50-60s overnight with some PVCs.      MEDICATIONS  (STANDING):  diltiazem    Tablet 30 milliGRAM(s) Oral every 6 hours  gabapentin 300 milliGRAM(s) Oral at bedtime  heparin  Injectable 5000 Unit(s) SubCutaneous every 8 hours  metoprolol     tartrate 25 milliGRAM(s) Oral two times a day  piperacillin/tazobactam IVPB. 3.375 Gram(s) IV Intermittent every 8 hours  rivaroxaban 15 milliGRAM(s) Oral every 24 hours  simvastatin 10 milliGRAM(s) Oral at bedtime  sodium chloride 0.9%. 1000 milliLiter(s) (100 mL/Hr) IV Continuous <Continuous>  tamsulosin 0.4 milliGRAM(s) Oral at bedtime    MEDICATIONS  (PRN):  acetaminophen   Tablet. 650 milliGRAM(s) Oral every 6 hours PRN mild and moderate pain      Vital Signs Last 24 Hrs  T(C): 36.9 (02 Mar 2018 05:50), Max: 37.3 (01 Mar 2018 21:49)  T(F): 98.4 (02 Mar 2018 05:50), Max: 99.1 (01 Mar 2018 21:49)  HR: 69 (02 Mar 2018 05:50) (60 - 69)  BP: 132/64 (02 Mar 2018 05:50) (132/64 - 159/72)  BP(mean): --  RR: 18 (02 Mar 2018 05:50) (17 - 18)  SpO2: 98% (02 Mar 2018 05:50) (97% - 98%)  CAPILLARY BLOOD GLUCOSE        I&O's Summary    01 Mar 2018 07:01  -  02 Mar 2018 07:00  --------------------------------------------------------  IN: 0 mL / OUT: 1000 mL / NET: -1000 mL    02 Mar 2018 07:01  -  02 Mar 2018 09:32  --------------------------------------------------------  IN: 0 mL / OUT: 400 mL / NET: -400 mL        PHYSICAL EXAM:  GENERAL: NAD, well-developed  HEAD:  Atraumatic, Normocephalic  EYES: EOMI,  conjunctiva and sclera clear  NECK: Supple  CHEST/LUNG: Clear to auscultation bilaterally; No wheeze  HEART: Regular rate and rhythm; 2/6 systolic murmurs,  no rubs, or gallops  ABDOMEN: Soft, NO LLQ tenderness , Nondistended; Bowel sounds present  EXTREMITIES:  No clubbing, cyanosis, or edema  PSYCH: AAOx3  NEUROLOGY: non-focal  SKIN: No rashes or lesions    LABS:                        11.6   6.01  )-----------( 157      ( 01 Mar 2018 06:00 )             35.4     WBC Trend: 6.01<--, 5.68<--, 8.64<--  03-02    141  |  105  |  12  ----------------------------<  93  4.2   |  25  |  1.49<H>    Ca    8.9      02 Mar 2018 06:10  Phos  3.1     03-02  Mg     1.6     03-02      Creatinine Trend: 1.49<--, 1.47<--, 1.54<--, 1.78<--, 2.93<--, 3.75<--              RADIOLOGY & ADDITIONAL TESTS:    Imaging Personally Reviewed: x    Consultant(s) Notes Reviewed: x    Care Discussed with Consultants/Other Providers:

## 2018-03-02 NOTE — PROGRESS NOTE ADULT - PROBLEM SELECTOR PLAN 5
-[id number]: 3  -patient s/p catheter ablation and on Xarelto at home  -Per pharmacy, discharge with Xarelto at 15mg daily (CrCl at 46, can resume original dose when CrCl >50); follow-up outpatient to adjust dosage  -Cardizem at 30mg q6hrs  -Metoprolol 25mg BID

## 2018-03-04 LAB
BACTERIA BLD CULT: SIGNIFICANT CHANGE UP
BACTERIA BLD CULT: SIGNIFICANT CHANGE UP

## 2021-12-07 NOTE — H&P ADULT - PSH
H/O Spinal surgery Cyclosporine Counseling:  I discussed with the patient the risks of cyclosporine including but not limited to hypertension, gingival hyperplasia,myelosuppression, immunosuppression, liver damage, kidney damage, neurotoxicity, lymphoma, and serious infections. The patient understands that monitoring is required including baseline blood pressure, CBC, CMP, lipid panel and uric acid, and then 1-2 times monthly CMP and blood pressure.

## 2022-10-03 NOTE — PROGRESS NOTE ADULT - PROBLEM SELECTOR PLAN 4
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 Detail Level: Zone Render In Strict Bullet Format?: No Continue Regimen: CeraVe SA cleanser, triamcinolone 0.1% cream for itching\\nam lactin lotion, glycolic wipes\\ndiscussed avoiding picking at area, reviewed risks of scarring, discussed SPF

## 2024-02-29 NOTE — DISCHARGE NOTE ADULT - DO YOU HAVE DIFFICULTY CLIMBING STAIRS
[facility]  Neurology  [address] [place] [address] [address] [place] [address]  Phone: [phone]  Fax:   Follow Up Time: Routine

## 2024-05-21 NOTE — H&P ADULT - NEUROLOGICAL
negative Alert & oriented; no sensory, motor or coordination deficits, normal reflexes unable to assess